# Patient Record
Sex: MALE | Race: WHITE | Employment: OTHER | URBAN - METROPOLITAN AREA
[De-identification: names, ages, dates, MRNs, and addresses within clinical notes are randomized per-mention and may not be internally consistent; named-entity substitution may affect disease eponyms.]

---

## 2014-03-05 LAB
CREAT SERPL-MCNC: 1 MG/DL (ref 0.6–1.3)
GFR SERPL CREATININE-BSD FRML MDRD: >60 ML/MIN/1.73M2 (ref 90–120)

## 2017-03-03 ENCOUNTER — TELEPHONE (OUTPATIENT)
Dept: PEDIATRICS | Facility: CLINIC | Age: 67
End: 2017-03-03

## 2017-03-03 NOTE — TELEPHONE ENCOUNTER
Panel Management Review      Patient has the following on his problem list: None      Composite cancer screening  Chart review shows that this patient is due/due soon for the following Colonoscopy and px and fasten labs  Summary:    Patient is due/failing the following:   COLONOSCOPY and PHYSICAL    Action needed:   Patient needs office visit for Px. Will discuss Colonoscopy at that time.    Type of outreach:    Sent [employer] message.    Questions for provider review:    None                                                                                                                                    Pati NAJERA, THU,AAMA       Chart routed to Care Team .

## 2017-04-03 DIAGNOSIS — I25.83 CORONARY ARTERY DISEASE DUE TO LIPID RICH PLAQUE: ICD-10-CM

## 2017-04-03 DIAGNOSIS — I25.10 CORONARY ARTERY DISEASE DUE TO LIPID RICH PLAQUE: ICD-10-CM

## 2017-04-03 RX ORDER — AMLODIPINE BESYLATE 5 MG/1
5 TABLET ORAL DAILY
Qty: 90 TABLET | Refills: 1 | Status: SHIPPED | OUTPATIENT
Start: 2017-04-03 | End: 2017-08-25

## 2017-04-24 ENCOUNTER — TRANSFERRED RECORDS (OUTPATIENT)
Dept: HEALTH INFORMATION MANAGEMENT | Facility: CLINIC | Age: 67
End: 2017-04-24

## 2017-04-29 DIAGNOSIS — E11.29 TYPE 2 DIABETES MELLITUS WITH OTHER DIABETIC KIDNEY COMPLICATION (H): ICD-10-CM

## 2017-05-01 NOTE — TELEPHONE ENCOUNTER
Prescription approved per [facility] Refill Protocol.  Upcoming physical appointment this week.    Alanna Isaac, RN  Triage Nurse

## 2017-05-01 NOTE — TELEPHONE ENCOUNTER
METFORMIN 1000MG         Last Written Prescription Date: 10/28/2016  Last Fill Quantity: 90, # refills: 1  Last Office Visit with [facility], [facility] or  Health prescribing provider:  10/28/2016   Next 5 appointments (look out 90 days)     May 05, 2017  2:20 PM CDT   PHYSICAL with Nick Morton MD   [facility] ([facility])    [address] [place] [address] [address]  [facility] [address] [phone]                   BP Readings from Last 3 Encounters:   10/28/16 108/68   10/18/16 130/78   04/11/16 124/70     Lab Results   Component Value Date    MICROL 7 10/28/2016     Lab Results   Component Value Date    UMALCR 5.75 10/28/2016     Creatinine   Date Value Ref Range Status   03/11/2016 0.78 0.66 - 1.25 mg/dL Final   ]  GFR Estimate   Date Value Ref Range Status   03/11/2016 >90  Non  GFR Calc   >60 mL/min/1.7m2 Final   02/17/2016 80 >60 mL/min/1.7m2 Final     Comment:     Non  GFR Calc   01/28/2016 83 >60 mL/min/1.7m2 Final     Comment:     Non  GFR Calc     GFR Estimate If Black   Date Value Ref Range Status   03/11/2016 >90   GFR Calc   >60 mL/min/1.7m2 Final   02/17/2016 >90   GFR Calc   >60 mL/min/1.7m2 Final   01/28/2016 >90   GFR Calc   >60 mL/min/1.7m2 Final     Lab Results   Component Value Date    CHOL  12/20/2016     Canceled, Test credited   Charge credited  CORRECTED ON 12/22 AT 0930: PREVIOUSLY REPORTED        Lab Results   Component Value Date    HDL  12/20/2016     Canceled, Test credited   Charge credited  CORRECTED ON 12/22 AT 0930: PREVIOUSLY REPORTED AS 38       Lab Results   Component Value Date    LDL 56 12/20/2016     Lab Results   Component Value Date    TRIG  12/20/2016     Canceled, Test credited   Charge credited   Fasting specimen  CORRECTED ON 12/22 AT 0930: PREVIOUSLY REPORTED  Borderline high:  150 199   mg/dl High:             200 499 mg/dl Very high:        >499 mg/dl Fasting   specimen       Lab Results   Component Value Date    CHOLHDLRATIO 4.4 09/25/2015     Lab Results   Component Value Date    AST 16 09/25/2015     Lab Results   Component Value Date    ALT  12/20/2016     Canceled, Test credited   Charge credited  CORRECTED ON 12/22 AT 0930: PREVIOUSLY REPORTED AS 23       Lab Results   Component Value Date    A1C 8.7 12/20/2016    A1C 8.0 10/28/2016    A1C 7.9 01/27/2016    A1C 7.3 09/25/2015    A1C 7.6 03/06/2015     Potassium   Date Value Ref Range Status   03/11/2016 4.5 3.4 - 5.3 mmol/L Final

## 2017-05-05 ENCOUNTER — OFFICE VISIT (OUTPATIENT)
Dept: PEDIATRICS | Facility: CLINIC | Age: 67
End: 2017-05-05
Payer: COMMERCIAL

## 2017-05-05 VITALS
HEART RATE: 88 BPM | WEIGHT: 225.3 LBS | RESPIRATION RATE: 14 BRPM | TEMPERATURE: 98.1 F | HEIGHT: 70 IN | DIASTOLIC BLOOD PRESSURE: 70 MMHG | SYSTOLIC BLOOD PRESSURE: 118 MMHG | BODY MASS INDEX: 32.25 KG/M2

## 2017-05-05 DIAGNOSIS — Z71.89 GOALS OF CARE, COUNSELING/DISCUSSION: ICD-10-CM

## 2017-05-05 DIAGNOSIS — Z00.00 ENCOUNTER FOR ROUTINE ADULT HEALTH EXAMINATION WITHOUT ABNORMAL FINDINGS: Primary | ICD-10-CM

## 2017-05-05 DIAGNOSIS — I25.83 CORONARY ARTERY DISEASE DUE TO LIPID RICH PLAQUE: ICD-10-CM

## 2017-05-05 DIAGNOSIS — E78.5 HYPERLIPIDEMIA LDL GOAL <100: ICD-10-CM

## 2017-05-05 DIAGNOSIS — I25.10 CORONARY ARTERY DISEASE DUE TO LIPID RICH PLAQUE: ICD-10-CM

## 2017-05-05 DIAGNOSIS — E11.29 TYPE 2 DIABETES MELLITUS WITH OTHER DIABETIC KIDNEY COMPLICATION (H): ICD-10-CM

## 2017-05-05 DIAGNOSIS — Z12.11 SPECIAL SCREENING FOR MALIGNANT NEOPLASMS, COLON: ICD-10-CM

## 2017-05-05 LAB — HBA1C MFR BLD: 11.3 % (ref 4.3–6)

## 2017-05-05 PROCEDURE — 83036 HEMOGLOBIN GLYCOSYLATED A1C: CPT | Performed by: INTERNAL MEDICINE

## 2017-05-05 PROCEDURE — 36415 COLL VENOUS BLD VENIPUNCTURE: CPT | Performed by: INTERNAL MEDICINE

## 2017-05-05 PROCEDURE — 99397 PER PM REEVAL EST PAT 65+ YR: CPT | Performed by: INTERNAL MEDICINE

## 2017-05-05 PROCEDURE — 80053 COMPREHEN METABOLIC PANEL: CPT | Performed by: INTERNAL MEDICINE

## 2017-05-05 RX ORDER — ROSUVASTATIN CALCIUM 40 MG/1
40 TABLET, COATED ORAL DAILY
Qty: 90 TABLET | Refills: 3 | Status: SHIPPED | OUTPATIENT
Start: 2017-05-05 | End: 2017-06-27

## 2017-05-05 NOTE — NURSING NOTE
"Chief Complaint   Patient presents with     Physical       Initial /70  Pulse 88  Temp 98.1  F (36.7  C) (Oral)  Resp 14  Ht 5' 10\" (1.778 m)  Wt 225 lb 4.8 oz (102.2 kg)  BMI 32.33 kg/m2 Estimated body mass index is 32.33 kg/(m^2) as calculated from the following:    Height as of this encounter: 5' 10\" (1.778 m).    Weight as of this encounter: 225 lb 4.8 oz (102.2 kg).  Medication Reconciliation: complete   Pati J, CMA,AAMA      "

## 2017-05-05 NOTE — PATIENT INSTRUCTIONS
Preventive Health Recommendations:       Male Ages 65 and over    Yearly exam:             See your health care provider every year in order to  o   Review health changes.   o   Discuss preventive care.    o   Review your medicines if your doctor has prescribed any.    Talk with your health care provider about whether you should have a test to screen for prostate cancer (PSA).    Every 3 years, have a diabetes test (fasting glucose). If you are at risk for diabetes, you should have this test more often.    Every 5 years, have a cholesterol test. Have this test more often if you are at risk for high cholesterol or heart disease.     Every 10 years, have a colonoscopy. Or, have a yearly FIT test (stool test). These exams will check for colon cancer.    Talk to with your health care provider about screening for Abdominal Aortic Aneurysm if you have a family history of AAA or have a history of smoking.  Shots:     Get a flu shot each year.     Get a tetanus shot every 10 years.     Talk to your doctor about your pneumonia vaccines. There are now two you should receive - Pneumovax (PPSV 23) and Prevnar (PCV 13).    Talk to your doctor about a shingles vaccine.     Talk to your doctor about the hepatitis B vaccine.  Nutrition:     Eat at least 5 servings of fruits and vegetables each day.     Eat whole-grain bread, whole-wheat pasta and brown rice instead of white grains and rice.     Talk to your doctor about Calcium and Vitamin D.   Lifestyle    Exercise for at least 150 minutes a week (30 minutes a day, 5 days a week). This will help you control your weight and prevent disease.     Limit alcohol to one drink per day.     No smoking.     Wear sunscreen to prevent skin cancer.     See your dentist every six months for an exam and cleaning.     See your eye doctor every 1 to 2 years to screen for conditions such as glaucoma, macular degeneration and cataracts.    Lab Results   Component Value Date    A1C 11.3 05/05/2017     A1C 8.7 12/20/2016    A1C 8.0 10/28/2016    A1C 7.9 01/27/2016    A1C 7.3 09/25/2015

## 2017-05-05 NOTE — MR AVS SNAPSHOT
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   o   Discuss preventive care.    o   Review your medicines if your doctor has prescribed any.    Talk with your health care provider about whether you should have a test to screen for prostate cancer (PSA).    Every 3 years, have a diabetes test (fasting glucose). If you are at risk for diabetes, you should have this test more often.    Every 5 years, have a cholesterol test. Have this test more often if you are at risk for high cholesterol or heart disease.     Every 10 years, have a colonoscopy. Or, have a yearly FIT test (stool test). These exams will check for colon cancer.    Talk to with your health care provider about screening for Abdominal Aortic Aneurysm if you have a family history of AAA or have a history of smoking.  Shots:     Get a flu shot each year.     Get a tetanus shot every 10 years.     Talk to your doctor about your pneumonia vaccines. There are now two you should receive - Pneumovax (PPSV 23) and Prevnar (PCV 13).    Talk to your doctor about a shingles vaccine.     Talk to your doctor about the hepatitis B vaccine.  Nutrition:     Eat at least 5 servings of  fruits and vegetables each day.     Eat whole-grain bread, whole-wheat pasta and brown rice instead of white grains and rice.     Talk to your doctor about Calcium and Vitamin D.   Lifestyle    Exercise for at least 150 minutes a week (30 minutes a day, 5 days a week). This will help you control your weight and prevent disease.     Limit alcohol to one drink per day.     No smoking.     Wear sunscreen to prevent skin cancer.     See your dentist every six months for an exam and cleaning.     See your eye doctor every 1 to 2 years to screen for conditions such as glaucoma, macular degeneration and cataracts.    Lab Results   Component Value Date    A1C 11.3 05/05/2017    A1C 8.7 12/20/2016    A1C 8.0 10/28/2016    A1C 7.9 01/27/2016    A1C 7.3 09/25/2015               Follow-ups after your visit        Additional Services     GASTROENTEROLOGY ADULT REF PROCEDURE ONLY       Last Lab Result: Creatinine (mg/dL)       Date                     Value                 03/11/2016               0.78             ----------  Body mass index is 32.33 kg/(m^2).     Needed:  No  Language:  English    Patient will be contacted to schedule procedure.     Please be aware that coverage of these services is subject to the terms and limitations of your health insurance plan.  Call member services at your health plan with any benefit or coverage questions.  Any procedures must be performed at a [facility] facility OR coordinated by your clinic's referral office.    Please bring the following with you to your appointment:    (1) Any X-Rays, CTs or MRIs which have been performed.  Contact the facility where they were done to arrange for  prior to your scheduled appointment.    (2) List of current medications   (3) This referral request   (4) Any documents/labs given to you for this referral                  Future tests that were ordered for you today     Open Future Orders        Priority Expected Expires Ordered    **A1C  "FUTURE 6mo Routine 11/1/2017 12/1/2017 5/5/2017            Who to contact     If you have questions or need follow up information about today's clinic visit or your schedule please contact [facility] [place] directly at [phone].  Normal or non-critical lab and imaging results will be communicated to you by MyChart, letter or phone within 4 business days after the clinic has received the results. If you do not hear from us within 7 days, please contact the clinic through [facility] [employer] or phone. If you have a critical or abnormal lab result, we will notify you by phone as soon as possible.  Submit refill requests through [email or web address] or call your pharmacy and they will forward the refill request to us. Please allow 3 business days for your refill to be completed.          Additional Information About Your Visit        [facility] [employer] Information     [email or web address] gives you secure access to your electronic health record. If you see a primary care provider, you can also send messages to your care team and make appointments. If you have questions, please call your primary care clinic.  If you do not have a primary care provider, please call [phone] and they will assist you.        Care EveryWhere ID     This is your Care EveryWhere ID. This could be used by other organizations to access your [facility] medical records  [id number]        Your Vitals Were     Pulse Temperature Respirations Height BMI (Body Mass Index)       88 98.1  F (36.7  C) (Oral) 14 5' 10\" (1.778 m) 32.33 kg/m2        Blood Pressure from Last 3 Encounters:   05/05/17 118/70   10/28/16 108/68   10/18/16 130/78    Weight from Last 3 Encounters:   05/05/17 225 lb 4.8 oz (102.2 kg)   10/28/16 229 lb 3.2 oz (104 kg)   10/18/16 227 lb 11.2 oz (103.3 kg)              We Performed the Following     Comprehensive metabolic panel (BMP + Alb, Alk Phos, ALT, AST, Total. Bili, TP)     GASTROENTEROLOGY ADULT REF PROCEDURE ONLY     Hemoglobin A1c          Where to get your " medicines      These medications were sent to [id number] [place] - [address] [facility] [address]  [address], [place] [address]     Phone:  [phone]     rosuvastatin 40 MG tablet          Primary Care Provider Office Phone # Fax #    Nick Morton [phone]       [facility] [address] [place] [address]        Thank you!     Thank you for choosing [facility]  for your care. Our goal is always to provide you with excellent care. Hearing back from our patients is one way we can continue to improve our services. Please take a few minutes to complete the written survey that you may receive in the mail after your visit with us. Thank you!             Your Updated Medication List - Protect others around you: Learn how to safely use, store and throw away your medicines at www.disposemymeds.org.          This list is accurate as of: 5/5/17  3:50 PM.  Always use your most recent med list.                   Brand Name Dispense Instructions for use    amLODIPine 5 MG tablet    NORVASC    90 tablet    Take 1 tablet (5 mg) by mouth daily       aspirin 81 MG tablet      Take 81 mg by mouth daily       blood glucose monitoring lancets     3 Box    change every 3 pokes.       blood glucose monitoring test strip    FREESTYLE TEST STRIPS    100 strip    use as directed       carvedilol 6.25 MG tablet    COREG    180 tablet    Take 1 tablet (6.25 mg) by mouth 2 times daily (with meals)       clopidogrel 75 MG tablet    PLAVIX    90 tablet    Take 1 tablet (75 mg) by mouth daily       glucosamine-chondroitin 500-400 MG Caps per capsule      Take 1 capsule by mouth daily       lisinopril 5 MG tablet    PRINIVIL/ZESTRIL    90 tablet    Take 1 tablet (5 mg) by mouth daily       metFORMIN 1000 MG tablet    GLUCOPHAGE    180 tablet    TAKE 1 TABLET(1000 MG) BY MOUTH TWICE DAILY WITH MEALS       Multi-vitamin Tabs tablet      Take 1  tablet by mouth daily       nitroglycerin 0.4 MG sublingual tablet    NITROSTAT    25 tablet    Place 1 tablet (0.4 mg) under the tongue every 5 minutes as needed for chest pain       rosuvastatin 40 MG tablet    CRESTOR    90 tablet    Take 1 tablet (40 mg) by mouth daily       tadalafil 10 MG tablet    CIALIS    12 tablet    Take 1 tablet (10 mg) by mouth daily as needed for erectile dysfunction TAKEN AT LEAST 30 MINUTES BEFORE SEXUAL ACTIVITY       vitamin B complex with vitamin C Tabs tablet      Take 1 tablet by mouth daily       VITAMIN D3 PO      Take 2,000 Units by mouth daily

## 2017-05-05 NOTE — PROGRESS NOTES
SUBJECTIVE:                                                            Karsten Christy is a 66 year old male who presents for Preventive Visit.  Are you in the first 12 months of your Medicare coverage?  Within one year      Physical   Annual:     Getting at least 3 servings of Calcium per day::  Yes    Bi-annual eye exam::  Yes    Dental care twice a year::  Yes    Sleep apnea or symptoms of sleep apnea::  Sleep apnea    Diet::  Low salt, Low fat/cholesterol, Diabetic and Carbohydrate counting    Frequency of exercise::  1 day/week    Duration of exercise::  15-30 minutes    Taking medications regularly::  Yes    Medication side effects::  Not applicable    Additional concerns today::  YES      COGNITIVE SCREEN  1) Repeat 3 items (Banana, Sunrise, Chair)    2) Clock draw: NORMAL  3) 3 item recall: Recalls 3 objects  Results: 3 items recalled: COGNITIVE IMPAIRMENT LESS LIKELY    Mini-CogTM Copyright S Diana. Licensed by the author for use in [facility] [employer]; reprinted with permission ([email or web address]). All rights reserved.        Reviewed and updated as needed this visit by clinical staff  Tobacco  Allergies  Med Hx  Surg Hx  Fam Hx  Soc Hx        Reviewed and updated as needed this visit by Provider        Social History   Substance Use Topics     Smoking status: Former Smoker     Types: Cigars     Smokeless tobacco: Never Used      Comment: cigar once per month- none lately     Alcohol use 0.0 oz/week     0 Standard drinks or equivalent per week      Comment: 3 drinks per week       The patient does not drink >3 drinks per day nor >7 drinks per week.      Karsten presents today for follow up of DIABETES MELLITUS.  Patient is being treated with oral agents and diet.  Patient glucose self monitoring as follows: rarely.     Patient concerns: is concerned about drinkign lots more lemonaide, thinks a1c will be worse. feels well.        Today's PHQ-2 Score: 0  PHQ-2 ( 1999 [employer]) 5/5/2017   Q1: Little interest or  pleasure in doing things -   Q2: Feeling down, depressed or hopeless -   PHQ-2 Score -   Little interest or pleasure in doing things Not at all   Feeling down, depressed or hopeless Not at all   PHQ-2 Score 0       Do you feel safe in your environment - Yes    Do you have a Health Care Directive?: Yes: Advance Directive has been received and scanned.    Current providers sharing in care for this patient include:   Patient Care Team:  Nick Morton MD as PCP - General (Internal Medicine)      Hearing impairment: Yes, hearing device    Ability to successfully perform activities of daily living: Yes, no assistance needed     Fall risk:  Fallen 2 or more times in the past year?: No  Any fall with injury in the past year?: No    Home safety:  none identified      The following health maintenance items are reviewed in Epic and correct as of today:  Health Maintenance   Topic Date Due     AORTIC ANEURYSM SCREENING (SYSTEM ASSIGNED)  08/15/2014     FALL RISK ASSESSMENT  03/06/2016     ADVANCE DIRECTIVE PLANNING Q5 YRS (NO INBASKET)  07/15/2016     COLONOSCOPY Q10 YEARS NO INBASKET MESSAGE  07/20/2016     EYE EXAM Q1 YEAR( NO INBASKET)  01/21/2017     CREATININE Q1 YEAR (NO INBASKET)  03/11/2017     A1C Q6 MO( NO INBASKET)  06/20/2017     INFLUENZA VACCINE (SYSTEM ASSIGNED)  09/01/2017     FOOT EXAM Q1 YEAR( NO INBASKET)  10/28/2017     MICROALBUMIN Q1 YEAR( NO INBASKET)  10/28/2017     LIPID MONITORING Q1 YEAR( NO INBASKET)  12/20/2017     PSA Q2 YR  03/18/2018     TSH W/ FREE T4 REFLEX Q2 YEAR (NO INBASKET)  10/28/2018     TETANUS Q10 YR  03/06/2025     PNEUMOCOCCAL  Completed     HEPATITIS C SCREENING  Completed           ROS:  Constitutional, HEENT, cardiovascular, pulmonary, GI, , musculoskeletal, neuro, skin, endocrine and psych systems are negative, except as otherwise noted.    Problem list, Medication list, Allergies, and Medical/Social/Surgical histories reviewed in EPIC and updated as  "appropriate.  OBJECTIVE:                                                            /70  Pulse 88  Temp 98.1  F (36.7  C) (Oral)  Resp 14  Ht 5' 10\" (1.778 m)  Wt 225 lb 4.8 oz (102.2 kg)  BMI 32.33 kg/m2 Estimated body mass index is 32.33 kg/(m^2) as calculated from the following:    Height as of this encounter: 5' 10\" (1.778 m).    Weight as of this encounter: 225 lb 4.8 oz (102.2 kg).  EXAM:   GENERAL: healthy, alert and no distress  EYES: Eyes grossly normal to inspection, PERRL and conjunctivae and sclerae normal  HENT: ear canals and TM's normal, nose and mouth without ulcers or lesions  NECK: no adenopathy, no asymmetry, masses, or scars and thyroid normal to palpation  RESP: lungs clear to auscultation - no rales, rhonchi or wheezes  CV: regular rate and rhythm, normal S1 S2, no S3 or S4, no murmur, click or rub, no peripheral edema and peripheral pulses strong  ABDOMEN: soft, nontender, no hepatosplenomegaly, no masses and bowel sounds normal  MS: no gross musculoskeletal defects noted, no edema  SKIN: no suspicious lesions or rashes  NEURO: Normal strength and tone, mentation intact and speech normal  PSYCH: mentation appears normal, affect normal/bright    Lab Results   Component Value Date    A1C 11.3 05/05/2017    A1C 8.7 12/20/2016    A1C 8.0 10/28/2016    A1C 7.9 01/27/2016    A1C 7.3 09/25/2015       ASSESSMENT / PLAN:                                                            1. Encounter for routine adult health examination without abnormal findings  d/w pt preventative care measures including seat belt use, bike helmet, moderation of EtOH, avoiding tobacco, avoiding excessive sun exposure/sunscreen, wt management or wt loss if BMI > 30, need to screen for lipid disorders, mood disorders, CAD risk factors, etc. Also discussed accident prevention and future RHM schedule.     2. Type 2 diabetes mellitus with other diabetic kidney complication; goal A1c < 7%  discussed with patient (or " "patient's parents/caregiver) pathophysiology of condition and treatment options.  a1c much worse, history suggestive of diet as the cause. reviwed with patient this is a very major decline in contorl, patient aware and will work on diet. recheck a1c in 3 months and if not sig improvement would do MTM referral and consider further modalities. patient agrees and will work hard on diet.   - Hemoglobin A1c  - Comprehensive metabolic panel (BMP + Alb, Alk Phos, ALT, AST, Total. Bili, TP)  - **A1C FUTURE 6mo; Future    3. Special screening for malignant neoplasms, colon  - GASTROENTEROLOGY ADULT REF PROCEDURE ONLY    4. Blood pressure goal < 140/90  discussed with patient (or patient's parents/caregiver) pathophysiology of condition and treatment options.  reviewed labs, medications, diet ,etc.      5. Hyperlipidemia LDL goal <100  discussed with patient (or patient's parents/caregiver) pathophysiology of condition and treatment options.  reviewed labs, medications, diet ,etc.      6. Coronary artery disease due to lipid rich plaque  Well controlled on current medication(s). discussed with patient (or patient's parents/caregiver) pathophysiology of condition and treatment options.  reviewed labs, medications, diet ,etc.    - rosuvastatin (CRESTOR) 40 MG tablet; Take 1 tablet (40 mg) by mouth daily  Dispense: 90 tablet; Refill: 3    End of Life Planning:  Patient currently has an advanced directive: Yes.  Practitioner is supportive of decision.    COUNSELING:  Reviewed preventive health counseling, as reflected in patient instructions        Estimated body mass index is 32.33 kg/(m^2) as calculated from the following:    Height as of this encounter: 5' 10\" (1.778 m).    Weight as of this encounter: 225 lb 4.8 oz (102.2 kg).     reports that he has quit smoking. His smoking use included Cigars. He has never used smokeless tobacco.      Appropriate preventive services were discussed with this patient, including applicable " screening as appropriate for cardiovascular disease, diabetes, osteopenia/osteoporosis, and glaucoma.  As appropriate for age/gender, discussed screening for colorectal cancer, prostate cancer, breast cancer, and cervical cancer. Checklist reviewing preventive services available has been given to the patient.    Reviewed patients plan of care and provided an AVS. The Basic Care Plan (routine screening as documented in Health Maintenance) for Karsten meets the Care Plan requirement. This Care Plan has been established and reviewed with the Patient.    Counseling Resources:  ATP IV Guidelines  Pooled Cohorts Equation Calculator  Breast Cancer Risk Calculator  FRAX Risk Assessment  ICSI Preventive Guidelines  Dietary Guidelines for Americans, 2010  USDA's MyPlate  ASA Prophylaxis  Lung CA Screening    I have discussed with patient the risks, benefits, medications, treatment options and modalities.   I have instructed the patient to call or schedule a follow-up appointment if any problems or failure to improve.       Nick Morton MD  [facility] [place]      Answers for HPI/ROS submitted by the patient on 5/5/2017   Q1: Little interest or pleasure in doing things: 0=Not at all  Q2: Feeling down, depressed or hopeless: 0=Not at all  PHQ-2 Score: 0

## 2017-05-06 LAB
ALBUMIN SERPL-MCNC: 3.5 G/DL (ref 3.4–5)
ALP SERPL-CCNC: 132 U/L (ref 40–150)
ALT SERPL W P-5'-P-CCNC: 23 U/L (ref 0–70)
ANION GAP SERPL CALCULATED.3IONS-SCNC: 10 MMOL/L (ref 3–14)
AST SERPL W P-5'-P-CCNC: 9 U/L (ref 0–45)
BILIRUB SERPL-MCNC: 1 MG/DL (ref 0.2–1.3)
BUN SERPL-MCNC: 15 MG/DL (ref 7–30)
CALCIUM SERPL-MCNC: 9.2 MG/DL (ref 8.5–10.1)
CHLORIDE SERPL-SCNC: 99 MMOL/L (ref 94–109)
CO2 SERPL-SCNC: 25 MMOL/L (ref 20–32)
CREAT SERPL-MCNC: 0.84 MG/DL (ref 0.66–1.25)
GFR SERPL CREATININE-BSD FRML MDRD: ABNORMAL ML/MIN/1.7M2
GLUCOSE SERPL-MCNC: 427 MG/DL (ref 70–99)
POTASSIUM SERPL-SCNC: 4.4 MMOL/L (ref 3.4–5.3)
PROT SERPL-MCNC: 6.6 G/DL (ref 6.8–8.8)
SODIUM SERPL-SCNC: 134 MMOL/L (ref 133–144)

## 2017-06-27 DIAGNOSIS — I25.10 CORONARY ARTERY DISEASE DUE TO LIPID RICH PLAQUE: ICD-10-CM

## 2017-06-27 DIAGNOSIS — I25.83 CORONARY ARTERY DISEASE DUE TO LIPID RICH PLAQUE: ICD-10-CM

## 2017-06-27 RX ORDER — ROSUVASTATIN CALCIUM 40 MG/1
40 TABLET, COATED ORAL DAILY
Qty: 90 TABLET | Refills: 1 | Status: SHIPPED | OUTPATIENT
Start: 2017-06-27

## 2017-07-06 ENCOUNTER — TELEPHONE (OUTPATIENT)
Dept: PHARMACY | Facility: CLINIC | Age: 67
End: 2017-07-06

## 2017-07-06 NOTE — TELEPHONE ENCOUNTER
Please call patient to see Dr. Morton for diabetes check in August.    Ely Limon, PharmD [facility]  Medication Therapy Management Practitioner   #[phone]

## 2017-07-07 NOTE — TELEPHONE ENCOUNTER
Appt scheduled for 08/25/2017. Pt sent a reminder via my chart as requested.  Pati NAJERA, THU,MARY

## 2017-07-14 ENCOUNTER — HOSPITAL ENCOUNTER (OUTPATIENT)
Facility: CLINIC | Age: 67
Discharge: HOME | End: 2017-07-14
Attending: INTERNAL MEDICINE | Admitting: INTERNAL MEDICINE
Payer: COMMERCIAL

## 2017-07-14 VITALS
BODY MASS INDEX: 30.8 KG/M2 | OXYGEN SATURATION: 95 % | RESPIRATION RATE: 16 BRPM | SYSTOLIC BLOOD PRESSURE: 118 MMHG | DIASTOLIC BLOOD PRESSURE: 70 MMHG | HEIGHT: 71 IN | WEIGHT: 220 LBS

## 2017-07-14 LAB
COLONOSCOPY: NORMAL
GLUCOSE BLDC GLUCOMTR-MCNC: 174 MG/DL (ref 70–99)

## 2017-07-14 PROCEDURE — 25000128 H RX IP 250 OP 636: Performed by: INTERNAL MEDICINE

## 2017-07-14 PROCEDURE — G0500 MOD SEDAT ENDO SERVICE >5YRS: HCPCS | Performed by: INTERNAL MEDICINE

## 2017-07-14 PROCEDURE — 88305 TISSUE EXAM BY PATHOLOGIST: CPT | Performed by: INTERNAL MEDICINE

## 2017-07-14 PROCEDURE — 45380 COLONOSCOPY AND BIOPSY: CPT | Performed by: INTERNAL MEDICINE

## 2017-07-14 PROCEDURE — 88305 TISSUE EXAM BY PATHOLOGIST: CPT | Mod: 26 | Performed by: INTERNAL MEDICINE

## 2017-07-14 PROCEDURE — 82962 GLUCOSE BLOOD TEST: CPT

## 2017-07-14 PROCEDURE — 45385 COLONOSCOPY W/LESION REMOVAL: CPT | Performed by: INTERNAL MEDICINE

## 2017-07-14 RX ORDER — FENTANYL CITRATE 50 UG/ML
INJECTION, SOLUTION INTRAMUSCULAR; INTRAVENOUS PRN
Status: DISCONTINUED | OUTPATIENT
Start: 2017-07-14 | End: 2017-07-14 | Stop reason: HOSPADM

## 2017-07-14 RX ORDER — ONDANSETRON 2 MG/ML
4 INJECTION INTRAMUSCULAR; INTRAVENOUS EVERY 6 HOURS PRN
Status: DISCONTINUED | OUTPATIENT
Start: 2017-07-14 | End: 2017-07-14 | Stop reason: HOSPADM

## 2017-07-14 RX ORDER — NALOXONE HYDROCHLORIDE 0.4 MG/ML
.1-.4 INJECTION, SOLUTION INTRAMUSCULAR; INTRAVENOUS; SUBCUTANEOUS
Status: DISCONTINUED | OUTPATIENT
Start: 2017-07-14 | End: 2017-07-14 | Stop reason: HOSPADM

## 2017-07-14 RX ORDER — ONDANSETRON 4 MG/1
4 TABLET, ORALLY DISINTEGRATING ORAL EVERY 6 HOURS PRN
Status: DISCONTINUED | OUTPATIENT
Start: 2017-07-14 | End: 2017-07-14 | Stop reason: HOSPADM

## 2017-07-14 RX ORDER — LIDOCAINE 40 MG/G
CREAM TOPICAL
Status: DISCONTINUED | OUTPATIENT
Start: 2017-07-14 | End: 2017-07-14 | Stop reason: HOSPADM

## 2017-07-14 RX ORDER — FLUMAZENIL 0.1 MG/ML
0.2 INJECTION, SOLUTION INTRAVENOUS
Status: DISCONTINUED | OUTPATIENT
Start: 2017-07-14 | End: 2017-07-14 | Stop reason: HOSPADM

## 2017-07-14 RX ORDER — ONDANSETRON 2 MG/ML
4 INJECTION INTRAMUSCULAR; INTRAVENOUS
Status: DISCONTINUED | OUTPATIENT
Start: 2017-07-14 | End: 2017-07-14 | Stop reason: HOSPADM

## 2017-07-14 NOTE — IP AVS SNAPSHOT
MRN:[id number]                      After Visit Summary   7/14/2017    Karsten Christy    MRN: [id number]           Thank you!     Thank you for choosing [facility] for your care. Our goal is always to provide you with excellent care. Hearing back from our patients is one way we can continue to improve our services. Please take a few minutes to complete the written survey that you may receive in the mail after you visit. If you would like to speak to someone directly about your visit please contact Patient Relations at [phone]. Thank you!          Patient Information     Date Of Birth          1950        About your hospital stay     You were admitted on:  July 14, 2017 You last received care in the:  [facility]    You were discharged on:  July 14, 2017       Who to Call     For medical emergencies, please call 911.  For non-urgent questions about your medical care, please call your primary care provider or clinic, [phone]  For questions related to your surgery, please call your surgery clinic        Attending Provider     Provider Specialty    Gabriella England MD Gastroenterology       Primary Care Provider Office Phone # Fax #    Nick Morton [phone]      Your next 10 appointments already scheduled     Aug 25, 2017 10:00 AM CDT   Office Visit with Nick Morton MD   [facility] ([facility]    [address] [place] [address]  [address]  [facility] [address] [phone]           Bring a current list of meds and any records pertaining to this visit.  For Physicals, please bring immunization records and any forms needing to be filled out.  Please arrive 10 minutes early to complete paperwork.              Further instructions from your care team         Understanding Colon and Rectal Polyps     The colon has a smooth lining composed of millions of cells.     The colon (also called the large  intestine) is a muscular tube that forms the last part of the digestive tract. It absorbs water and stores food waste. The colon is about 4 to 6 feet long. The rectum is the last 6 inches of the colon. The colon and rectum have a smooth lining composed of millions of cells. Changes in these cells can lead to growths in the colon that can become cancerous and should be removed.     When the Colon Lining Changes  Changes that occur in the cells that line the colon or rectum can lead to growths called polyps. Over a period of years, polyps can turn cancerous. Removing polyps early may prevent cancer from ever forming.      Polyps  Polyps are fleshy clumps of tissue that form on the lining of the colon or rectum. Small polyps are usually benign (not cancerous). However, over time, cells in a polyp can change and become cancerous. The larger a polyp grows, the more likely this is to happen. Also, certain types of polyps known as adenomatous polyps are considered premalignant. This means that they will almost always become cancerous if they re not removed.          Cancer  Almost all colorectal cancers start when polyp cells begin growing abnormally. As a cancerous tumor grows, it may involve more and more of the colon or rectum. In time, cancer can also grow beyond the colon or rectum and spread to nearby organs or to glands called lymph nodes. The cells can also travel to other parts of the body. This is known as metastasis. The earlier a cancerous tumor is removed, the better the chance of preventing its spread.        [id number] [facility], [address] [place] [address]. All rights reserved. This information is not intended as a substitute for professional medical care. Always follow your healthcare professional's instructions.    Pending Results     No orders found from 7/12/2017 to 7/15/2017.            Admission Information     Date & Time Provider Department Dept. Phone    7/14/2017 Gabriella England  "MD [facility] End[facility] [phone]      Your Vitals Were     Blood Pressure Respirations Height Weight Pulse Oximetry BMI (Body Mass Index)    120/78 15 1.791 m (5' 10.5\") 99.8 kg (220 lb) 95% 31.12 kg/m2      MyChart Information     [employer] gives you secure access to your electronic health record. If you see a primary care provider, you can also send messages to your care team and make appointments. If you have questions, please call your primary care clinic.  If you do not have a primary care provider, please call [phone] and they will assist you.        Care EveryWhere ID     This is your Care EveryWhere ID. This could be used by other organizations to access your [facility] medical records  [id number]        Equal Access to Services     KIRT HE : Lauren Ambrose, misty castillo, lukas turner, julia martínez. So [facility] [phone].    ATENCIÓN: Si habla español, tiene a barnes disposición servicios gratuitos de asistencia lingüística. Llame al [phone].    We comply with applicable federal civil rights laws and [place] laws. We do not discriminate on the basis of race, color, national origin, age, disability sex, sexual orientation or gender identity.               Review of your medicines      CONTINUE these medicines which have NOT CHANGED        Dose / Directions    amLODIPine 5 MG tablet   Commonly known as:  NORVASC   Used for:  Coronary artery disease due to lipid rich plaque        Dose:  5 mg   Take 1 tablet (5 mg) by mouth daily   Quantity:  90 tablet   Refills:  1       aspirin 81 MG tablet        Dose:  81 mg   Take 81 mg by mouth daily   Refills:  0       blood glucose monitoring lancets   Used for:  Mixed hyperlipidemia        change every 3 pokes.   Quantity:  3 Box   Refills:  3       blood glucose monitoring test strip   Commonly known as:  FREESTYLE TEST STRIPS   Used for:  Mixed hyperlipidemia        use as directed "   Quantity:  100 strip   Refills:  11       carvedilol 6.25 MG tablet   Commonly known as:  COREG   Used for:  Coronary artery disease due to lipid rich plaque        Dose:  6.25 mg   Take 1 tablet (6.25 mg) by mouth 2 times daily (with meals)   Quantity:  180 tablet   Refills:  3       clopidogrel 75 MG tablet   Commonly known as:  PLAVIX   Used for:  Coronary artery disease due to lipid rich plaque        Dose:  75 mg   Take 1 tablet (75 mg) by mouth daily   Quantity:  90 tablet   Refills:  3       glucosamine-chondroitin 500-400 MG Caps per capsule        Dose:  1 capsule   Take 1 capsule by mouth daily   Refills:  0       lisinopril 5 MG tablet   Commonly known as:  PRINIVIL/ZESTRIL   Used for:  Coronary artery disease due to lipid rich plaque        Dose:  5 mg   Take 1 tablet (5 mg) by mouth daily   Quantity:  90 tablet   Refills:  3       metFORMIN 1000 MG tablet   Commonly known as:  GLUCOPHAGE   Used for:  Type 2 diabetes mellitus with other diabetic kidney complication (H)        TAKE 1 TABLET(1000 MG) BY MOUTH TWICE DAILY WITH MEALS   Quantity:  180 tablet   Refills:  1       Multi-vitamin Tabs tablet        Dose:  1 tablet   Take 1 tablet by mouth daily   Refills:  0       nitroGLYcerin 0.4 MG sublingual tablet   Commonly known as:  NITROSTAT   Used for:  Unstable angina (H)        Dose:  0.4 mg   Place 1 tablet (0.4 mg) under the tongue every 5 minutes as needed for chest pain   Quantity:  25 tablet   Refills:  2       rosuvastatin 40 MG tablet   Commonly known as:  CRESTOR   Used for:  Coronary artery disease due to lipid rich plaque        Dose:  40 mg   Take 1 tablet (40 mg) by mouth daily   Quantity:  90 tablet   Refills:  1       tadalafil 10 MG tablet   Commonly known as:  CIALIS   Used for:  Impotence of organic origin        Dose:  10 mg   Take 1 tablet (10 mg) by mouth daily as needed for erectile dysfunction TAKEN AT LEAST 30 MINUTES BEFORE SEXUAL ACTIVITY   Quantity:  12 tablet   Refills:  5        vitamin B complex with vitamin C Tabs tablet        Dose:  1 tablet   Take 1 tablet by mouth daily   Refills:  0       VITAMIN D3 PO        Dose:  2000 Units   Take 2,000 Units by mouth daily   Refills:  0                Protect others around you: Learn how to safely use, store and throw away your medicines at www.disposemymeds.org.             Medication List: This is a list of all your medications and when to take them. Check marks below indicate your daily home schedule. Keep this list as a reference.      Medications           Morning Afternoon Evening Bedtime As Needed    amLODIPine 5 MG tablet   Commonly known as:  NORVASC   Take 1 tablet (5 mg) by mouth daily                                aspirin 81 MG tablet   Take 81 mg by mouth daily                                blood glucose monitoring lancets   change every 3 pokes.                                blood glucose monitoring test strip   Commonly known as:  FREESTYLE TEST STRIPS   use as directed                                carvedilol 6.25 MG tablet   Commonly known as:  COREG   Take 1 tablet (6.25 mg) by mouth 2 times daily (with meals)                                clopidogrel 75 MG tablet   Commonly known as:  PLAVIX   Take 1 tablet (75 mg) by mouth daily                                glucosamine-chondroitin 500-400 MG Caps per capsule   Take 1 capsule by mouth daily                                lisinopril 5 MG tablet   Commonly known as:  PRINIVIL/ZESTRIL   Take 1 tablet (5 mg) by mouth daily                                metFORMIN 1000 MG tablet   Commonly known as:  GLUCOPHAGE   TAKE 1 TABLET(1000 MG) BY MOUTH TWICE DAILY WITH MEALS                                Multi-vitamin Tabs tablet   Take 1 tablet by mouth daily                                nitroGLYcerin 0.4 MG sublingual tablet   Commonly known as:  NITROSTAT   Place 1 tablet (0.4 mg) under the tongue every 5 minutes as needed for chest pain                                 rosuvastatin 40 MG tablet   Commonly known as:  CRESTOR   Take 1 tablet (40 mg) by mouth daily                                tadalafil 10 MG tablet   Commonly known as:  CIALIS   Take 1 tablet (10 mg) by mouth daily as needed for erectile dysfunction TAKEN AT LEAST 30 MINUTES BEFORE SEXUAL ACTIVITY                                vitamin B complex with vitamin C Tabs tablet   Take 1 tablet by mouth daily                                VITAMIN D3 PO   Take 2,000 Units by mouth daily

## 2017-07-14 NOTE — LETTER
June 28, 2017      Karsten Christy  [address]   [place] [address]              Dear Karsten Christy,    Thank you for choosing [facility]. You are scheduled for the following service.     Date:  Friday, July 14             Procedure:  COLONOSCOPY  Doctor:        Tomás   Arrival Time:  10am *check in at Emergency/Endoscopy desk*  Procedure Time:  10:30am    Location:   [facility]        Endoscopy Department, First Floor (Enter through ER Doors) *        [address] [place], [place] [address]      [phone] or [phone] () to reschedule        Colonoscopy is the most accurate test to detect colon polyps and colon cancer; and the only test where polyps can be removed. During this procedure, a doctor examines the lining of your large intestine and rectum through a flexible tube.           Transportation  Arrange for a ride for the day of your procedure with a responsible adult.  A taxi ride is not an option unless you are accompanied by a responsible adult. If you fail to arrange transportation with a responsible adult, your procedure will be cancelled and rescheduled.    MIRALAX -GATORADE  PREP    Purchase the following supplies at your local pharmacy:  - 2 (two) bisacodyl tablets: each tablet contains 5 mg.  (Dulcolax  laxative NOT Dulcolax  stool softener)   - 1 (one) 8.3 oz bottle of Polyethylene Glycol (PEG) 3350 Powder   (MiraLAX , Smooth LAX , ClearLAX  or equivalent)  - 64 oz Gatorade    Regular Gatorade, Gatorade G2 , Powerade , Powerade Zero  or Pedialyte  is acceptable. Red colored flavors are not allowed; all other colors (yellow, green, orange, purple and blue) are okay. It is also okay to buy two 2.12 oz packets of powdered Gatorade that can be mixed with water to a total volume of 64 oz of liquid.  - 1 (one) 10 oz bottle of Magnesium Citrate (Red colored flavors are not allowed)  It is also okay for you to use a 0.5 oz  package of powdered magnesium citrate (17 g) mixed with 10 oz of water.      PREPARATION FOR COLONOSCOPY    7 days before:    Discontinue fiber supplements and medications containing iron. This includes Metamucil  and Fibercon ; and multivitamins with iron.  3 days before:    Begin a low-fiber diet. A low-fiber diet helps making the cleanout more effective.     Examples of a low-fiber diet include (but are not limited to): white bread, white rice, pasta, crackers, fish, chicken, eggs, ground beef, creamy peanut butter, cooked/steamed/boiled vegetables, canned fruit, bananas, melons, milk, plain yogurt cheese, salad dressing and other condiments.     The following are not allowed on a low-fiber diet: seeds, nuts, popcorn, bran, whole wheat, corn, quinoa, raw fruits and vegetables, berries and dried fruit, beans and lentils.    For additional details on low-fiber diet, please refer to the table on the last page.  2 days before:    Continue the low-fiber diet.     Drink at least 8 glasses of water throughout the day.     Stop eating solid foods at 11:45 pm.  1 day before:    In the morning: begin a clear liquid diet (liquids you can see through).     Examples of a clear liquid diet include: water, clear broth or bouillon, Gatorade, Pedialyte or Powerade, carbonated and non-carbonated soft drinks (Sprite , 7-Up , ginger ale), strained fruit juices without pulp (apple, white grape, white cranberry), Jell-O  and popsicles.     The following are not allowed on a clear liquid diet: red liquids, alcoholic beverages, coffee, dairy products (milk, creamer, and yogurt), protein shakes, creamy broths, juice with pulp and chewing tobacco.    At noon: take 2 (two) bisacodyl tablets     At 4 (and no later than 6pm): start drinking the Miralax-Gatorade preparation (8.3 oz of Miralax mixed with 64 oz of Gatorade in a large pitcher). Drink 1(one) 8 oz glass every 15 minutes thereafter, until the mixture is gone.    COLON CLEANSING  TIPS: drink adequate amounts of fluids before and after your colon cleansing to prevent dehydration. Stay near a toilet because you will have diarrhea. Even if you are sitting on the toilet, continue to drink the cleansing solution every 15 minutes. If you feel nauseous or vomit, rinse your mouth with water, take a 15 to 30-minute-break and then continue drinking the solution. You will be uncomfortable until the stool has flushed from your colon (in about 2 to 4 hours). You may feel chilled.    Day of your procedure  You may take all of your morning medications including blood pressure medications, blood thinners (if you have not been instructed to stop these by our office), methadone, anti-seizure medications with sips of water 3 hours prior to your procedure or earlier. Do not take insulin or vitamins prior to your procedure. Continue the clear liquid diet.   4 hours prior: drink 10 oz of magnesium citrate. It may be easier to drink it with a straw.    STOP consuming all liquids after that.     Do not take anything by mouth during this time.     Allow extra time to travel to your procedure as you may need to stop and use a restroom along the way.  You are ready for the procedure, if you followed all instructions and your stool is no longer formed, but clear or yellow liquid. If you are unsure whether your colon is clean, please call our office at [phone] before you leave for your appointment.  Bring the following to your procedure:  - Insurance Card/Photo ID.   - List of current medications including over-the-counter medications and supplements.   - Your rescue inhaler if you currently use one to control asthma.      Canceling or rescheduling your appointment:   If you must cancel or reschedule your appointment, please call [phone] as soon as possible.      COLONOSCOPY PRE-PROCEDURE CHECKLIST  If you have diabetes, ask your regular doctor for diet and medication restrictions.  If you take an  anticoagulant or anti-platelet medication (such as Coumadin , Lovenox , Pradaxa , Xarelto , Eliquis , etc.), please call your primary doctor for advice on holding this medication.  If you take aspirin you may continue to do so.  If you are or may be pregnant, please discuss the risks and benefits of this procedure with your doctor.          What happens during a colonoscopy?    Plan to spend up to two hours, starting at registration time, at the endoscopy center the day of your procedure. The colonoscopy takes an average of 15 to 30 minutes. Recovery time is about 30 minutes.    Before the exam:    You will change into a gown.    Your medical history and medication list will be reviewed with you, unless that has been done over the phone prior to the procedure.     A nurse will insert an intravenous (IV) line into your hand or arm.    The doctor will meet with you and will give you a consent form to sign.    During the exam:     Medicine will be given through the IV line to help you relax.     Your heart rate and oxygen levels will be monitored. If your blood pressure is low, you may be given fluids through the IV line.     The doctor will insert a flexible hollow tube, called a colonoscope, into your rectum. The scope will be advanced slowly through the large intestine (colon).    You may have a feeling of fullness or pressure.     If an abnormal tissue or a polyp is found, the doctor may remove it through the endoscope for closer examination, or biopsy. Tissue removal is painless    After the exam:           Any tissue samples removed during the exam will be sent to a lab for evaluation. It may take 5-7 working days for you to be notified of the results.     A nurse will provide you with complete discharge instructions before you leave the endoscopy center. Be sure to ask the nurse for specific instructions if you take blood thinners such as Aspirin, Coumadin or Plavix.     The doctor will prepare a full report for  you and for the physician who referred you for the procedure.     Your doctor will talk with you about the initial results of your exam.      Medication given during the exam will prohibit you from driving for the rest of the day.     Following the exam, you may resume your normal diet. Your first meal should be light, no greasy foods. Avoid alcohol until the next day.     You may resume your regular activities the day after the procedure.     LOW-FIBER DIET    Foods RECOMMENDED Foods to AVOID   Breads, Cereal, Rice and Pasta:   White bread, rolls, biscuits, croissant and zoey toast.   Waffles, Tajik toast and pancakes.   White rice, noodles, pasta, macaroni and peeled cooked potatoes.   Plain crackers and saltines.   Cooked cereals: farina, cream of rice.   Cold cereals: Puffed Rice , Rice Krispies , Corn Flakes  and Special K    Breads, Cereal, Rice and Pasta:   Breads or rolls with nuts, seeds or fruit.   Whole wheat, pumpernickel, rye breads and cornbread.   Potatoes with skin, brown or wild rice, and kasha (buckwheat).     Vegetables:   Tender cooked and canned vegetables without seeds: carrots, asparagus tips, green or wax beans, pumpkin, spinach, lima beans. Vegetables:   Raw or steamed vegetables.   Vegetables with seeds.   Sauerkraut.   Winter squash, peas, broccoli, Brussel sprouts, cabbage, onions, cauliflower, baked beans, peas and corn.   Fruits:   Strained fruit juice.   Canned fruit, except pineapple.   Ripe bananas and melon. Fruits:   Prunes and prune juice.   Raw fruits.   Dried fruits: figs, dates and raisins.   Milk/Dairy:   Milk: plain or flavored.   Yogurt, custard and ice cream.   Cheese and cottage cheese Milk/Dairy:     Meat and other proteins:   ground, well-cooked tender beef, lamb, ham, veal, pork, fish, poultry and organ meats.   Eggs.   Peanut butter without nuts. Meat and other proteins:   Tough, fibrous meats with gristle.   Dry beans, peas and lentils.   Peanut butter with  nuts.   Tofu.   Fats, Snack, Sweets, Condiments and Beverages:   Margarine, butter, oils, mayonnaise, sour cream and salad dressing, plain gravy.   Sugar, hard candy, clear jelly, honey and syrup.   Spices, cooked herbs, bouillon, broth and soups made with allowed vegetable, ketchup and mustard.   Coffee, tea and carbonated drinks.   Plain cakes, cookies and pretzels.   Gelatin, plain puddings, custard, ice cream, sherbet and popsicles. Fats, Snack, Sweets, Condiments and Beverages:   Nuts, seeds and coconut.   Jam, marmalade and preserves.   Pickles, olives, relish and horseradish.   All desserts containing nuts, seeds, dried fruit and coconut; or made from whole grains or bran.   Candy made with nuts or seeds.   Popcorn.           DIRECTIONS TO THE ENDOSCOPY DEPARTMENT     From the north ([facility])  Take [address] South, exit on [address] [address]. Get into the left hand juan, turn left (east), go one-half mile to [address] and turn left. Go north to the first stoplight, take a right on [address] and follow it to the Emergency entrance.    From the south ([facility])  Take [address]N to the [address]E split and exit on [address] [address]. On [address] [address], turn left (west) to [address]. Turn right (north) on [address]. Go north to the first stoplight, take a right on [address] and follow it to the Emergency entrance.    From the east via 35E ([facility])  Take [address] south to [address] [address] exit. Turn right on [address] [address]. Go west to [address]. Turn right (north) on [address]. Go to the first stoplight, take a right and follow on [address] to the Emergency entrance.    From the east via [address] ([facility])  Take [address] to [address]. Turn left (south) on [address] to [address]. Turn left (east) on [address] and follow it to the Emergency entrance.    From the west via [address] ([place], [place])  Take [address]  east to [address]. Turn right (south) on [address] to [address]. Turn left (east) on [address] and follow it to the Emergency entrance.

## 2017-07-14 NOTE — DISCHARGE INSTRUCTIONS
Understanding Colon and Rectal Polyps     The colon has a smooth lining composed of millions of cells.     The colon (also called the large intestine) is a muscular tube that forms the last part of the digestive tract. It absorbs water and stores food waste. The colon is about 4 to 6 feet long. The rectum is the last 6 inches of the colon. The colon and rectum have a smooth lining composed of millions of cells. Changes in these cells can lead to growths in the colon that can become cancerous and should be removed.     When the Colon Lining Changes  Changes that occur in the cells that line the colon or rectum can lead to growths called polyps. Over a period of years, polyps can turn cancerous. Removing polyps early may prevent cancer from ever forming.      Polyps  Polyps are fleshy clumps of tissue that form on the lining of the colon or rectum. Small polyps are usually benign (not cancerous). However, over time, cells in a polyp can change and become cancerous. The larger a polyp grows, the more likely this is to happen. Also, certain types of polyps known as adenomatous polyps are considered premalignant. This means that they will almost always become cancerous if they re not removed.          Cancer  Almost all colorectal cancers start when polyp cells begin growing abnormally. As a cancerous tumor grows, it may involve more and more of the colon or rectum. In time, cancer can also grow beyond the colon or rectum and spread to nearby organs or to glands called lymph nodes. The cells can also travel to other parts of the body. This is known as metastasis. The earlier a cancerous tumor is removed, the better the chance of preventing its spread.        [id number] [facility], [address] [place] [address], [place] [address]. All rights reserved. This information is not intended as a substitute for professional medical care. Always follow your healthcare professional's instructions.

## 2017-07-14 NOTE — PROCEDURES
"PRE-PROCEDURE H&P    CHIEF COMPLAINT / REASON FOR PROCEDURE:  screening    PERTINENT HISTORY :    Past Medical History:   Diagnosis Date     CAD (coronary artery disease) 1-2016    PCI, two DAYANNA to proximal and mid LAD     Calculus of kidney      Esophageal reflux      HEARING LOSS NOISE-INDUCED 9/25/2005     History of blood transfusion     given during knee arthrotomy     Hypertension      Mixed hyperlipidemia 6/4/2005    Cardiac Risk Factors: male over 45, HTN, family history; goal LDL < 130; [place] 10-year Cardiac Risk Score of 12%      NONSPECIFIC MEDICAL HISTORY     \"growth\" on kidney seen on MRI     Post PTCA 3-    staged procedure-DAYANNA OM 2     SLEEP APNEA     treated surgically, uvulopharyngoplasty;not using CPAP     Stable Renal Cyst 11/26/2005    followed by Dr. Crum, [facility]     Type 2 diabetes mellitus without complications (H)     type 2     Uric acid nephrolithiasis       Past Surgical History:   Procedure Laterality Date     ANGIOGRAM  01/27/2016    Successful PCI with drug-eluting stent placement in the proximal and mid LAD     ARTHROSCOPY SHOULDER ROTATOR CUFF REPAIR  3/18/2014    Procedure: ARTHROSCOPY SHOULDER ROTATOR CUFF REPAIR;  Left shoulder arthroscopic assessment, subacromial decompression, biceps tenodesis,  arthroscopic rotator cuff repair       ENT SURGERY      uppp, tonsils ,septoplasty     EYE SURGERY      lasik     GENITOURINARY SURGERY      surg for kidney stone     SURGICAL HISTORY OF -       Nissen Fundoplication     SURGICAL HISTORY OF -       ACL repair     SURGICAL HISTORY OF -       Uvuloplasty     SURGICAL HISTORY OF -       right shoulder arthroplasty and rotator cuff reconstruction     SURGICAL HISTORY OF -       arthroscopy of left knee         Bleeding tendencies:  No    Relevant Family History:  NONE     Relevant Social History:  NONE      A relevant review of systems was performed and was negative      ALLERGIES/SENSITIVITIES:   Allergies   Allergen " Reactions     Percocet [Oxycodone-Acetaminophen]      hallucinations       CURRENT MEDICATIONS:   No current outpatient prescriptions on file.        PRE-SEDATION ASSESSMENT:    Lung Exam:  normal  Heart Exam:  normal  Airway Exam: normal  Previous reaction to anesthesia/sedation:   No  Sedation plan based on assessment: Moderate (conscious) sedation  ASA Classification:  2 - Mild systemic disease        IMPRESSION:  screening    PLAN:  colonoscopy     Gabriella England  [facility]  Office: [phone]

## 2017-07-17 LAB — COPATH REPORT: NORMAL

## 2017-07-25 ENCOUNTER — TELEPHONE (OUTPATIENT)
Dept: PEDIATRICS | Facility: CLINIC | Age: 67
End: 2017-07-25

## 2017-07-25 NOTE — TELEPHONE ENCOUNTER
Panel Management Review      Patient has the following on his problem list:     Diabetes    ASA: Passed    Last A1C  Lab Results   Component Value Date    A1C 11.3 05/05/2017    A1C 8.7 12/20/2016    A1C 8.0 10/28/2016    A1C 7.9 01/27/2016    A1C 7.3 09/25/2015     A1C tested: FAILED    Last LDL:    Lab Results   Component Value Date    CHOL  12/20/2016     Canceled, Test credited   Charge credited  CORRECTED ON 12/22 AT 0930: PREVIOUSLY REPORTED        Lab Results   Component Value Date    HDL  12/20/2016     Canceled, Test credited   Charge credited  CORRECTED ON 12/22 AT 0930: PREVIOUSLY REPORTED AS 38       Lab Results   Component Value Date    LDL 56 12/20/2016     Lab Results   Component Value Date    TRIG  12/20/2016     Canceled, Test credited   Charge credited   Fasting specimen  CORRECTED ON 12/22 AT 0930: PREVIOUSLY REPORTED  Borderline high:  150 199   mg/dl High:             200 499 mg/dl Very high:       >499 mg/dl Fasting   specimen       Lab Results   Component Value Date    CHOLHDLRATIO 4.4 09/25/2015     Lab Results   Component Value Date    NHDL  12/20/2016     Not Calculated  CORRECTED ON 12/22 AT 0930: PREVIOUSLY REPORTED AS 90         Is the patient on a Statin? YES             Is the patient on Aspirin? YES    Medications     HMG CoA Reductase Inhibitors    rosuvastatin (CRESTOR) 40 MG tablet    Salicylates    aspirin 81 MG tablet          Last three blood pressure readings:  BP Readings from Last 3 Encounters:   07/14/17 118/70   05/05/17 118/70   10/28/16 108/68       Date of last diabetes office visit: 05/05/2017     Tobacco History:     History   Smoking Status     Former Smoker     Types: Cigars   Smokeless Tobacco     Never Used     Comment: cigar once per month- none lately             Composite cancer screening  Chart review shows that this patient is due/due soon for the following None  Summary:    Patient is due/failing the following:   A1C    Action needed:   Patient  needs office visit for A1-c  .    Type of outreach:    Appt scheduled. see other Te with mtm    Questions for provider review:    None                                                                                                                                    Pati NAJERA, THU,AAMA       Chart routed to Care Team .

## 2017-08-25 ENCOUNTER — OFFICE VISIT (OUTPATIENT)
Dept: PEDIATRICS | Facility: CLINIC | Age: 67
End: 2017-08-25
Payer: COMMERCIAL

## 2017-08-25 VITALS
DIASTOLIC BLOOD PRESSURE: 70 MMHG | SYSTOLIC BLOOD PRESSURE: 120 MMHG | HEART RATE: 76 BPM | RESPIRATION RATE: 14 BRPM | TEMPERATURE: 98 F | BODY MASS INDEX: 31.38 KG/M2 | WEIGHT: 221.8 LBS

## 2017-08-25 DIAGNOSIS — I25.10 CORONARY ARTERY DISEASE DUE TO LIPID RICH PLAQUE: ICD-10-CM

## 2017-08-25 DIAGNOSIS — I10 HTN (HYPERTENSION): ICD-10-CM

## 2017-08-25 DIAGNOSIS — Z71.89 GOALS OF CARE, COUNSELING/DISCUSSION: ICD-10-CM

## 2017-08-25 DIAGNOSIS — E78.5 HLD (HYPERLIPIDEMIA): ICD-10-CM

## 2017-08-25 DIAGNOSIS — E11.29 TYPE 2 DIABETES MELLITUS WITH OTHER DIABETIC KIDNEY COMPLICATION (H): ICD-10-CM

## 2017-08-25 DIAGNOSIS — I25.83 CORONARY ARTERY DISEASE DUE TO LIPID RICH PLAQUE: ICD-10-CM

## 2017-08-25 DIAGNOSIS — E11.29 TYPE 2 DIABETES MELLITUS WITH OTHER DIABETIC KIDNEY COMPLICATION (H): Primary | ICD-10-CM

## 2017-08-25 LAB
ALT SERPL W P-5'-P-CCNC: 23 U/L (ref 0–70)
ANION GAP SERPL CALCULATED.3IONS-SCNC: 8 MMOL/L (ref 3–14)
BUN SERPL-MCNC: 16 MG/DL (ref 7–30)
CALCIUM SERPL-MCNC: 8.7 MG/DL (ref 8.5–10.1)
CHLORIDE SERPL-SCNC: 105 MMOL/L (ref 94–109)
CHOLEST SERPL-MCNC: 112 MG/DL
CO2 SERPL-SCNC: 25 MMOL/L (ref 20–32)
CREAT SERPL-MCNC: 0.8 MG/DL (ref 0.66–1.25)
GFR SERPL CREATININE-BSD FRML MDRD: >90 ML/MIN/1.7M2
GLUCOSE SERPL-MCNC: 168 MG/DL (ref 70–99)
HBA1C MFR BLD: 9.9 % (ref 4.3–6)
HDLC SERPL-MCNC: 43 MG/DL
LDLC SERPL CALC-MCNC: 41 MG/DL
NONHDLC SERPL-MCNC: 69 MG/DL
POTASSIUM SERPL-SCNC: 4.3 MMOL/L (ref 3.4–5.3)
SODIUM SERPL-SCNC: 138 MMOL/L (ref 133–144)
TRIGL SERPL-MCNC: 140 MG/DL

## 2017-08-25 PROCEDURE — 83036 HEMOGLOBIN GLYCOSYLATED A1C: CPT | Performed by: INTERNAL MEDICINE

## 2017-08-25 PROCEDURE — 80048 BASIC METABOLIC PNL TOTAL CA: CPT | Performed by: INTERNAL MEDICINE

## 2017-08-25 PROCEDURE — 84460 ALANINE AMINO (ALT) (SGPT): CPT | Performed by: INTERNAL MEDICINE

## 2017-08-25 PROCEDURE — 99214 OFFICE O/P EST MOD 30 MIN: CPT | Performed by: INTERNAL MEDICINE

## 2017-08-25 PROCEDURE — 36415 COLL VENOUS BLD VENIPUNCTURE: CPT | Performed by: INTERNAL MEDICINE

## 2017-08-25 PROCEDURE — 80061 LIPID PANEL: CPT | Performed by: INTERNAL MEDICINE

## 2017-08-25 RX ORDER — GLIMEPIRIDE 1 MG/1
1 TABLET ORAL
Qty: 90 TABLET | Refills: 3 | Status: SHIPPED | OUTPATIENT
Start: 2017-08-25 | End: 2018-08-21

## 2017-08-25 RX ORDER — AMLODIPINE BESYLATE 5 MG/1
5 TABLET ORAL DAILY
Qty: 90 TABLET | Refills: 1 | Status: SHIPPED | OUTPATIENT
Start: 2017-08-25 | End: 2018-04-01

## 2017-08-25 NOTE — NURSING NOTE
"Chief Complaint   Patient presents with     Diabetes     would like medication renewals       Initial /70  Pulse 76  Temp 98  F (36.7  C) (Oral)  Resp 14  Wt 221 lb 12.8 oz (100.6 kg)  BMI 31.38 kg/m2 Estimated body mass index is 31.38 kg/(m^2) as calculated from the following:    Height as of 7/14/17: 5' 10.5\" (1.791 m).    Weight as of this encounter: 221 lb 12.8 oz (100.6 kg).  Medication Reconciliation: complete   Pati NAJERA, CMA,AAMA        "

## 2017-08-25 NOTE — PATIENT INSTRUCTIONS
Managing Diabetes: The A1C Test       Healthy red blood cells have some glucose stuck to them. A high A1C means that unhealthy amounts of glucose are stuck to the cells.   What is the A1C test?  Using your meter helps you track your blood sugar every day. But your glucose meter tells you the value at the time of testing only. You also need to know if your treatment plan is keeping you healthy over time. The hemoglobin A1C (or glycated hemoglobin) test can help. This test measures your average blood sugar level over a few months. A higher A1C result means that you have a higher risk of developing complications.  The A1C test  The A1C is a blood test done by your healthcare provider. You will likely have an A1C test every 3 to 6 months.  Your blood glucose goal  A1C has been shown as a percentage. But it can also be shown as a number representing the estimated Average Glucose (eAG). Unlike the A1C percentage, eAG is a number similar to the numbers listed on your daily glucose monitor. Both A1C and eAG measure the amount of glucose stuck to a protein called hemoglobin in red blood cells. Your healthcare provider will help you figure out what your ideal A1C or eAG should be. Your target number will depend on your age, general health, and other factors. If your current number is too high, your treatment plan may need changes, such as different medicines.  Sample results  Most people aim for an A1c lower than 7%. That s an eAG less than 154 mg/dL. Or, your healthcare provider may want you to aim for an A1C of 6%. That s an eAG of 126 mg/dL.     Glucose calculator  Visit http://professional.diabetes.org/diapro/glucose_calc for a chart that helps convert your A1C percentages into eAG numbers.   Date Last Reviewed: 6/1/2016 2000-2017 [employer]. [address] [place] [address], [place] [address]. All rights reserved. This information is not intended as a substitute for professional medical care. Always follow your  healthcare professional's instructions.

## 2017-08-25 NOTE — PROGRESS NOTES
SUBJECTIVE:                                                    Karsten Christy is a 67 year old male who presents to clinic today for the following health issues:    Diabetes Follow-up      Patient is checking blood sugars: very rare. Maintaining good eating habits    Diabetic concerns: None     Symptoms of hypoglycemia (low blood sugar): none     Paresthesias (numbness or burning in feet) or sores: No     Date of last diabetic eye exam: 2016 and scheduled for up coming eye appt    Hyperlipidemia Follow-Up      Rate your low fat/cholesterol diet?: good    Taking statin?  Yes, no muscle aches from statin    Other lipid medications/supplements?:  none    Hypertension Follow-up      Outpatient blood pressures are not being checked.    Low Salt Diet: low salt        Amount of exercise or physical activity: None    Problems taking medications regularly: No    Medication side effects: none  Diet: regular (no restrictions), low salt, low fat/cholesterol, carbohydrate counting and monitor sugar intake      patient doing well. feels better     Problem list and histories reviewed & adjusted, as indicated.  Additional history:   Patient Active Problem List    Diagnosis Date Noted     CAD (coronary artery disease)      Priority: Medium     Echo 1-26-16, EF 55-60%. Cath 01-27-16, DAYANNA x 2 to proximal and mid LAD       Hypertension      Priority: Medium     Type 2 diabetes mellitus with other diabetic kidney complication; goal A1c < 7% 10/15/2015     Priority: Medium     Blood pressure goal < 140/90 10/01/2013     Priority: Medium     Hyperlipidemia LDL goal <100 02/10/2010     Priority: Medium     Localized osteoarthrosis, lower leg 12/18/2008     Priority: Medium     Problem list name updated by automated process. Provider to review       Stable Renal Cyst 11/26/2005     Priority: Medium     followed by [facility]       HEARING LOSS NOISE-INDUCED 09/25/2005     Priority: Medium     Calculus of kidney 11/13/2004      Priority: Medium     Obstructive sleep apnea 11/13/2004     Priority: Medium     PSG of 1/2011 with AHI = 73; optimal nasal CPAP pressure = 11 cm H2O       Esophageal reflux 11/13/2004     Priority: Medium     Social History   Substance Use Topics     Smoking status: Former Smoker     Types: Cigars     Smokeless tobacco: Never Used      Comment: cigar once per month- none lately     Alcohol use 0.0 oz/week     0 Standard drinks or equivalent per week      Comment: 3 drinks per week     Family History   Problem Relation Age of Onset     Arthritis Father      GOUT     C.A.D. Father      ASCVD, age of onset over 80     C.A.D. Mother      ANGINA; premature onset     C.A.D. Maternal Grandfather      fatal MI under age 60     Prostate Cancer Paternal Uncle      onset over age 65     Cancer - colorectal No family hx of      Colon Cancer No family hx of        ROS:  A complete 10 point review of systems was taken and negative except for those noted in the subjective/HPI section(s) above       OBJECTIVE:                                                    /70  Pulse 76  Temp 98  F (36.7  C) (Oral)  Resp 14  Wt 221 lb 12.8 oz (100.6 kg)  BMI 31.38 kg/m2   Constitutional: healthy, alert and no distress  HEENT: normocephalic and atrumatic, mucous membranes moist, op clear, mucous membranes moist, neck supple   Cardiovascular: regular rate and rhythm no rubs, gallops or murmurs normal S1/S2; no S3 or S4  Respiratory: clear to auscultation bilaterally in all lung fields, normal insp/exp effort. Good air movement  Gastrointestinal: Abdomen soft, non-tender. BS normal. No masses, organomegaly  Musculoskeletal: extremities normal- no gross deformities noted, gait normal and normal muscle tone  Skin: no suspicious lesions or rashes  Neurologic: Gait normal. Reflexes normal and symmetric. Sensation grossly WNL.  Psychiatric: mentation appears normal and affect normal/bright      Lab Results   Component Value Date    A1C 9.9  "08/25/2017    A1C 11.3 05/05/2017    A1C 8.7 12/20/2016    A1C 8.0 10/28/2016    A1C 7.9 01/27/2016          ASSESSMENT/PLAN:                                                    (E11.29) Type 2 diabetes mellitus with other diabetic kidney complication; goal A1c < 7%  (primary encounter diagnosis)  Comment: discussed with patient (or patient's parents/caregiver) pathophysiology of condition and treatment options.  A1c not at goal, but improved from last check! plan to continue cares and plan, add amryl to regimen. New medication(s) indication(s), adverse effects, risks and benefits discussed. recheck in 3 months. consider MTM evaluation if not at goal at that time. reviewed labs, medications, diet ,etc.  Patient verbalized understanding and is agreeable to this plan.   Plan: glimepiride (AMARYL) 1 MG tablet, **A1C FUTURE         anytime        (I25.10,  I25.83) Coronary artery disease due to lipid rich plaque  Comment: Well controlled on current medication(s). .jnrv   Plan: amLODIPine (NORVASC) 5 MG tablet, **A1C FUTURE         anytime          (Z71.89) Blood pressure goal < 140/90  Comment: Well controlled on current medication(s). discussed with patient (or patient's parents/caregiver) pathophysiology of condition and treatment options.  reviewed labs, medications, diet ,etc.        Estimated body mass index is 31.12 kg/(m^2) as calculated from the following:    Height as of 7/14/17: 5' 10.5\" (1.791 m).    Weight as of 7/14/17: 220 lb (99.8 kg).        Return to clinic as needed or if symptoms persist, change, worsen or if any new symptoms develop.    Nick Morton M.D.  Internal Medicine-Pediatrics            "

## 2017-08-25 NOTE — MR AVS SNAPSHOT
After Visit Summary   8/25/2017    Karsten Christy    MRN: [id number]           Patient Information     Date Of Birth          1950        Visit Information        Provider Department      8/25/2017 10:00 AM Nick Morton MD [facility] [place]        Today's Diagnoses     Type 2 diabetes mellitus with other diabetic kidney complication; goal A1c < 7%    -  1    Coronary artery disease due to lipid rich plaque        Blood pressure goal < 140/90          Care Instructions      Managing Diabetes: The A1C Test       Healthy red blood cells have some glucose stuck to them. A high A1C means that unhealthy amounts of glucose are stuck to the cells.   What is the A1C test?  Using your meter helps you track your blood sugar every day. But your glucose meter tells you the value at the time of testing only. You also need to know if your treatment plan is keeping you healthy over time. The hemoglobin A1C (or glycated hemoglobin) test can help. This test measures your average blood sugar level over a few months. A higher A1C result means that you have a higher risk of developing complications.  The A1C test  The A1C is a blood test done by your healthcare provider. You will likely have an A1C test every 3 to 6 months.  Your blood glucose goal  A1C has been shown as a percentage. But it can also be shown as a number representing the estimated Average Glucose (eAG). Unlike the A1C percentage, eAG is a number similar to the numbers listed on your daily glucose monitor. Both A1C and eAG measure the amount of glucose stuck to a protein called hemoglobin in red blood cells. Your healthcare provider will help you figure out what your ideal A1C or eAG should be. Your target number will depend on your age, general health, and other factors. If your current number is too high, your treatment plan may need changes, such as different medicines.  Sample results  Most people aim for an A1c lower than 7%. That s an eAG  less than 154 mg/dL. Or, your healthcare provider may want you to aim for an A1C of 6%. That s an eAG of 126 mg/dL.     Glucose calculator  Visit http://professional.diabetes.org/diapro/glucose_calc for a chart that helps convert your A1C percentages into eAG numbers.   Date Last Reviewed: 6/1/2016 2000-2017 [employer]. [address] [place] [address], [place] [address]. All rights reserved. This information is not intended as a substitute for professional medical care. Always follow your healthcare professional's instructions.                Follow-ups after your visit        Your next 10 appointments already scheduled     Oct 13, 2017  7:30 AM CDT   LAB with RU LAB   [facility] ([facility])    [address] [place] [address]  [facility] [address] [phone]           Patient must bring picture ID. Patient should be prepared to give a urine specimen  Please do not eat 10-12 hours before your appointment if you are coming in fasting for labs on lipids, cholesterol, or glucose (sugar). Pregnant women should follow their Care Team instructions. Water with medications is okay. Do not drink coffee or other fluids. If you have concerns about taking  your medications, please ask at office or if scheduling via [employer], send a message by clicking on Secure Messaging, Message Your Care Team.            Oct 13, 2017  8:15 AM CDT   Return Visit with Renato Loaiza MD   [facility] ([email or web address])    [address] [place] [address]  [facility] [id number] [phone]            Nov 10, 2017 10:00 AM CST   Office Visit with Nick Morton MD   [facility] [place] ([facility] [place])    [address] [place] [address]  [address]  [place] [id number] [phone]           Bring a current list of meds and any records pertaining to this visit. For Physicals, please bring immunization records and any  forms needing to be filled out. Please arrive 10 minutes early to complete paperwork.              Future tests that were ordered for you today     Open Future Orders        Priority Expected Expires Ordered    **A1C FUTURE anytime Routine 8/25/2017 8/25/2018 8/25/2017            Who to contact     If you have questions or need follow up information about today's clinic visit or your schedule please contact [facility] [place] directly at [phone].  Normal or non-critical lab and imaging results will be communicated to you by [employer], letter or phone within 4 business days after the clinic has received the results. If you do not hear from us within 7 days, please contact the clinic through [employer] or phone. If you have a critical or abnormal lab result, we will notify you by phone as soon as possible.  Submit refill requests through [employer] or call your pharmacy and they will forward the refill request to us. Please allow 3 business days for your refill to be completed.          Additional Information About Your Visit        [employer] Information     [employer] gives you secure access to your electronic health record. If you see a primary care provider, you can also send messages to your care team and make appointments. If you have questions, please call your primary care clinic.  If you do not have a primary care provider, please call [phone] and they will assist you.        Care EveryWhere ID     This is your Care EveryWhere ID. This could be used by other organizations to access your [place] medical records  [id number]        Your Vitals Were     Pulse Temperature Respirations BMI (Body Mass Index)          76 98  F (36.7  C) (Oral) 14 31.38 kg/m2         Blood Pressure from Last 3 Encounters:   08/25/17 120/70   07/14/17 118/70   05/05/17 118/70    Weight from Last 3 Encounters:   08/25/17 221 lb 12.8 oz (100.6 kg)   07/14/17 220 lb (99.8 kg)   05/05/17 225 lb 4.8 oz (102.2 kg)                 Today's  Medication Changes          These changes are accurate as of: 8/25/17 10:18 AM.  If you have any questions, ask your nurse or doctor.               Start taking these medicines.        Dose/Directions    glimepiride 1 MG tablet   Commonly known as:  AMARYL   Used for:  Type 2 diabetes mellitus with other diabetic kidney complication (H)   Started by:  Nick Morton MD        Dose:  1 mg   Take 1 tablet (1 mg) by mouth every morning (before breakfast)   Quantity:  90 tablet   Refills:  3            Where to get your medicines      These medications were sent to [facility] [id number] [place] [address] [address] [facility] [address]  [address], [facility] [address]     Phone:  [phone]     amLODIPine 5 MG tablet    glimepiride 1 MG tablet                Primary Care Provider Office Phone # Fax #    Nick Morton [phone] [address] [place] [address]        Equal Access to Services     [facility]: Hadii aad ku hadasho Soomaali, waaxda luqadaha, qaybta kaalmada adeegyada, waxay idiin hayaan adeeg kharademetrice laanthony . So [facility] [phone].    ATENCIÓN: Si habla español, tiene a barnes disposición servicios gratuitos de asistencia lingüística. [facility] [phone].    We comply with applicable federal civil rights laws and [place] laws. We do not discriminate on the basis of race, color, national origin, age, disability sex, sexual orientation or gender identity.            Thank you!     Thank you for choosing [facility]  for your care. Our goal is always to provide you with excellent care. Hearing back from our patients is one way we can continue to improve our services. Please take a few minutes to complete the written survey that you may receive in the mail after your visit with us. Thank you!             Your Updated Medication List - Protect others around you: Learn how to safely use, store and throw away your medicines at  www.disposemymeds.org.          This list is accurate as of: 8/25/17 10:18 AM.  Always use your most recent med list.                   Brand Name Dispense Instructions for use Diagnosis    amLODIPine 5 MG tablet    NORVASC    90 tablet    Take 1 tablet (5 mg) by mouth daily    Coronary artery disease due to lipid rich plaque       aspirin 81 MG tablet      Take 81 mg by mouth daily        blood glucose monitoring lancets     3 Box    change every 3 pokes.    Mixed hyperlipidemia       blood glucose monitoring test strip    FREESTYLE TEST STRIPS    100 strip    use as directed    Mixed hyperlipidemia       carvedilol 6.25 MG tablet    COREG    180 tablet    Take 1 tablet (6.25 mg) by mouth 2 times daily (with meals)    Coronary artery disease due to lipid rich plaque       clopidogrel 75 MG tablet    PLAVIX    90 tablet    Take 1 tablet (75 mg) by mouth daily    Coronary artery disease due to lipid rich plaque       glimepiride 1 MG tablet    AMARYL    90 tablet    Take 1 tablet (1 mg) by mouth every morning (before breakfast)    Type 2 diabetes mellitus with other diabetic kidney complication (H)       glucosamine-chondroitin 500-400 MG Caps per capsule      Take 1 capsule by mouth daily        lisinopril 5 MG tablet    PRINIVIL/ZESTRIL    90 tablet    Take 1 tablet (5 mg) by mouth daily    Coronary artery disease due to lipid rich plaque       metFORMIN 1000 MG tablet    GLUCOPHAGE    180 tablet    TAKE 1 TABLET(1000 MG) BY MOUTH TWICE DAILY WITH MEALS    Type 2 diabetes mellitus with other diabetic kidney complication (H)       Multi-vitamin Tabs tablet      Take 1 tablet by mouth daily        nitroGLYcerin 0.4 MG sublingual tablet    NITROSTAT    25 tablet    Place 1 tablet (0.4 mg) under the tongue every 5 minutes as needed for chest pain    Unstable angina (H)       rosuvastatin 40 MG tablet    CRESTOR    90 tablet    Take 1 tablet (40 mg) by mouth daily    Coronary artery disease due to lipid rich plaque        tadalafil 10 MG tablet    CIALIS    12 tablet    Take 1 tablet (10 mg) by mouth daily as needed for erectile dysfunction TAKEN AT LEAST 30 MINUTES BEFORE SEXUAL ACTIVITY    Impotence of organic origin       vitamin B complex with vitamin C Tabs tablet      Take 1 tablet by mouth daily        VITAMIN D3 PO      Take 2,000 Units by mouth daily

## 2017-09-07 ENCOUNTER — TRANSFERRED RECORDS (OUTPATIENT)
Dept: HEALTH INFORMATION MANAGEMENT | Facility: CLINIC | Age: 67
End: 2017-09-07

## 2017-10-09 ENCOUNTER — ALLIED HEALTH/NURSE VISIT (OUTPATIENT)
Dept: NURSING | Facility: CLINIC | Age: 67
End: 2017-10-09
Payer: COMMERCIAL

## 2017-10-09 ENCOUNTER — PRE VISIT (OUTPATIENT)
Dept: CARDIOLOGY | Facility: CLINIC | Age: 67
End: 2017-10-09

## 2017-10-09 DIAGNOSIS — Z23 NEED FOR PROPHYLACTIC VACCINATION AND INOCULATION AGAINST INFLUENZA: Primary | ICD-10-CM

## 2017-10-09 PROCEDURE — 90662 IIV NO PRSV INCREASED AG IM: CPT

## 2017-10-09 PROCEDURE — 90471 IMMUNIZATION ADMIN: CPT

## 2017-10-09 NOTE — MR AVS SNAPSHOT
After Visit Summary   10/9/2017    Karsten Christy    MRN: [id number]           Patient Information     Date Of Birth          1950        Visit Information        Provider Department      10/9/2017 10:00 AM EA NURSE AB [facility]        Today's Diagnoses     Need for prophylactic vaccination and inoculation against influenza    -  1       Follow-ups after your visit        Your next 10 appointments already scheduled     Oct 09, 2017 10:00 AM CDT   Nurse Only with SIDNEY NURSE AB   [facility] ([facility])    [address] [place] [address]  [address]  [place] [address] [phone]            Oct 13, 2017  7:30 AM CDT   LAB with RU LAB   [facility] ([facility])    [address] [place] [address]  [facility] [id number] [phone]           Patient must bring picture ID. Patient should be prepared to give a urine specimen  Please do not eat 10-12 hours before your appointment if you are coming in fasting for labs on lipids, cholesterol, or glucose (sugar). Pregnant women should follow their Care Team instructions. Water with medications is okay. Do not drink coffee or other fluids. If you have concerns about taking  your medications, please ask at office or if scheduling via [employer], send a message by clicking on Secure Messaging, Message Your Care Team.            Oct 13, 2017  8:15 AM CDT   Return Visit with Renato Loaiza MD   [facility] ([facility])    [address] [place] [address]  [facility] [address] [phone]            Nov 10, 2017 10:00 AM CST   Office Visit with Nick Morton MD   [facility] [place] ([facility])    [address] [place] [address]  [address]  [place] [address] [phone]           Bring a current list of meds and any records pertaining to this visit. For Physicals, please bring immunization  records and any forms needing to be filled out. Please arrive 10 minutes early to complete paperwork.              Who to contact     If you have questions or need follow up information about today's clinic visit or your schedule please contact [facility] [place] directly at [phone].  Normal or non-critical lab and imaging results will be communicated to you by MyChart, letter or phone within 4 business days after the clinic has received the results. If you do not hear from us within 7 days, please contact the clinic through [facility] [employer] [facility] or phone. If you have a critical or abnormal lab result, we will notify you by phone as soon as possible.  Submit refill requests through [employer] [place] or call your pharmacy and they will forward the refill request to us. Please allow 3 business days for your refill to be completed.          Additional Information About Your Visit        [facility] Information     [employer] gives you secure access to your electronic health record. If you see a primary care provider, you can also send messages to your care team and make appointments. If you have questions, please call your primary care clinic.  If you do not have a primary care provider, please call [phone] and they will assist you.        Care EveryWhere ID     This is your Care EveryWhere ID. This could be used by other organizations to access your [place] medical records  [id number]         Blood Pressure from Last 3 Encounters:   08/25/17 120/70   07/14/17 118/70   05/05/17 118/70    Weight from Last 3 Encounters:   08/25/17 221 lb 12.8 oz (100.6 kg)   07/14/17 220 lb (99.8 kg)   05/05/17 225 lb 4.8 oz (102.2 kg)              We Performed the Following     FLU VACCINE, INCREASED ANTIGEN, PRESV FREE, AGE 65+ [69661]     Vaccine Administration, Initial [36360]        Primary Care Provider Office Phone # Fax #    Nick Morton [phone] [address] [place] [address]        Equal Access to  Services     [facility]: Hadii aad ku hadshawneelyndsey Elainemarybeth, waaxda luqadaha, qaybta kaalmada spsumandanyel, julia degroot . So [facility] [phone].    ATENCIÓN: Si habla solis, tiene a barnes disposición servicios gratuitos de asistencia lingüística. Llame al [phone].    We comply with applicable federal civil rights laws and [place] laws. We do not discriminate on the basis of race, color, national origin, age, disability, sex, sexual orientation, or gender identity.            Thank you!     Thank you for choosing [facility] [place]  for your care. Our goal is always to provide you with excellent care. Hearing back from our patients is one way we can continue to improve our services. Please take a few minutes to complete the written survey that you may receive in the mail after your visit with us. Thank you!             Your Updated Medication List - Protect others around you: Learn how to safely use, store and throw away your medicines at www.disposemymeds.org.          This list is accurate as of: 10/9/17  9:50 AM.  Always use your most recent med list.                   Brand Name Dispense Instructions for use Diagnosis    amLODIPine 5 MG tablet    NORVASC    90 tablet    Take 1 tablet (5 mg) by mouth daily    Coronary artery disease due to lipid rich plaque       aspirin 81 MG tablet      Take 81 mg by mouth daily        blood glucose monitoring lancets     3 Box    change every 3 pokes.    Mixed hyperlipidemia       blood glucose monitoring test strip    FREESTYLE TEST STRIPS    100 strip    use as directed    Mixed hyperlipidemia       carvedilol 6.25 MG tablet    COREG    180 tablet    Take 1 tablet (6.25 mg) by mouth 2 times daily (with meals)    Coronary artery disease due to lipid rich plaque       clopidogrel 75 MG tablet    PLAVIX    90 tablet    Take 1 tablet (75 mg) by mouth daily    Coronary artery disease due to lipid rich plaque       glimepiride 1 MG tablet    AMARYL     90 tablet    Take 1 tablet (1 mg) by mouth every morning (before breakfast)    Type 2 diabetes mellitus with other diabetic kidney complication       glucosamine-chondroitin 500-400 MG Caps per capsule      Take 1 capsule by mouth daily        lisinopril 5 MG tablet    PRINIVIL/ZESTRIL    90 tablet    Take 1 tablet (5 mg) by mouth daily    Coronary artery disease due to lipid rich plaque       metFORMIN 1000 MG tablet    GLUCOPHAGE    180 tablet    TAKE 1 TABLET(1000 MG) BY MOUTH TWICE DAILY WITH MEALS    Type 2 diabetes mellitus with other diabetic kidney complication       Multi-vitamin Tabs tablet      Take 1 tablet by mouth daily        nitroGLYcerin 0.4 MG sublingual tablet    NITROSTAT    25 tablet    Place 1 tablet (0.4 mg) under the tongue every 5 minutes as needed for chest pain    Unstable angina (H)       rosuvastatin 40 MG tablet    CRESTOR    90 tablet    Take 1 tablet (40 mg) by mouth daily    Coronary artery disease due to lipid rich plaque       tadalafil 10 MG tablet    CIALIS    12 tablet    Take 1 tablet (10 mg) by mouth daily as needed for erectile dysfunction TAKEN AT LEAST 30 MINUTES BEFORE SEXUAL ACTIVITY    Impotence of organic origin       vitamin B complex with vitamin C Tabs tablet      Take 1 tablet by mouth daily        VITAMIN D3 PO      Take 2,000 Units by mouth daily

## 2017-10-13 ENCOUNTER — OFFICE VISIT (OUTPATIENT)
Dept: CARDIOLOGY | Facility: CLINIC | Age: 67
End: 2017-10-13
Attending: INTERNAL MEDICINE
Payer: COMMERCIAL

## 2017-10-13 VITALS
HEIGHT: 70 IN | HEART RATE: 76 BPM | BODY MASS INDEX: 32.65 KG/M2 | WEIGHT: 228.1 LBS | SYSTOLIC BLOOD PRESSURE: 142 MMHG | DIASTOLIC BLOOD PRESSURE: 82 MMHG

## 2017-10-13 DIAGNOSIS — I25.10 CORONARY ARTERY DISEASE INVOLVING NATIVE CORONARY ARTERY OF NATIVE HEART WITHOUT ANGINA PECTORIS: Primary | ICD-10-CM

## 2017-10-13 PROCEDURE — 99214 OFFICE O/P EST MOD 30 MIN: CPT | Performed by: INTERNAL MEDICINE

## 2017-10-13 RX ORDER — CYANOCOBALAMIN (VITAMIN B-12) 500 MCG
400 LOZENGE ORAL 3 TIMES DAILY
COMMUNITY

## 2017-10-13 RX ORDER — NIACINAMIDE 500 MG
500 TABLET ORAL DAILY
COMMUNITY

## 2017-10-13 RX ORDER — NIACIN 500 MG
500 TABLET ORAL 2 TIMES DAILY WITH MEALS
COMMUNITY

## 2017-10-13 RX ORDER — GUAIFENESIN 400 MG/1
TABLET ORAL 2 TIMES DAILY
COMMUNITY

## 2017-10-13 RX ORDER — TAMSULOSIN HYDROCHLORIDE 0.4 MG/1
0.4 CAPSULE ORAL DAILY
COMMUNITY

## 2017-10-13 RX ORDER — LUTEIN 6 MG
TABLET ORAL 2 TIMES DAILY
COMMUNITY

## 2017-10-13 RX ORDER — PYRIDOXINE HCL (VITAMIN B6) 100 MG
TABLET ORAL DAILY
COMMUNITY

## 2017-10-13 RX ORDER — CYANOCOBALAMIN (VITAMIN B-12) 1000 MCG
TABLET, EXTENDED RELEASE ORAL DAILY
COMMUNITY

## 2017-10-13 NOTE — MR AVS SNAPSHOT
After Visit Summary   10/13/2017    Karsten Christy    MRN: [id number]           Patient Information     Date Of Birth          1950        Visit Information        Provider Department      10/13/2017 8:15 AM Brandt Pinon MD [facility]        Today's Diagnoses     Coronary artery disease involving native coronary artery of native heart without angina pectoris    -  1       Follow-ups after your visit        Additional Services     Follow-Up with Cardiologist                 Your next 10 appointments already scheduled     Nov 10, 2017 10:00 AM CST   Office Visit with Nick Morton MD   [facility]    [address] [place] [address]  [address]  [facility] [id number] [phone]           Bring a current list of meds and any records pertaining to this visit. For Physicals, please bring immunization records and any forms needing to be filled out. Please arrive 10 minutes early to complete paperwork.              Future tests that were ordered for you today     Open Future Orders        Priority Expected Expires Ordered    Follow-Up with Cardiologist Routine 4/11/2018 10/13/2018 10/13/2017            Who to contact     If you have questions or need follow up information about today's clinic visit or your schedule please contact [facility] directly at [phone].  Normal or non-critical lab and imaging results will be communicated to you by MyChart, letter or phone within 4 business days after the clinic has received the results. If you do not hear from us within 7 days, please contact the clinic through MyChart or phone. If you have a critical or abnormal lab result, we will notify you by phone as soon as possible.  Submit refill requests through MYFLY or call your pharmacy and they will forward the refill request to us. Please allow 3 business days for your refill  "to be completed.          Additional Information About Your Visit        [facility] Information     [employer] gives you secure access to your electronic health record. If you see a primary care provider, you can also send messages to your care team and make appointments. If you have questions, please call your primary care clinic.  If you do not have a primary care provider, please call [phone] and they will assist you.        Care EveryWhere ID     This is your Care EveryWhere ID. This could be used by other organizations to access your [facility] medical records  [id number]        Your Vitals Were     Pulse Height BMI (Body Mass Index)             76 1.778 m (5' 10\") 32.73 kg/m2          Blood Pressure from Last 3 Encounters:   10/13/17 142/82   08/25/17 120/70   07/14/17 118/70    Weight from Last 3 Encounters:   10/13/17 103.5 kg (228 lb 1.6 oz)   08/25/17 100.6 kg (221 lb 12.8 oz)   07/14/17 99.8 kg (220 lb)              We Performed the Following     Follow-Up with Cardiologist        Primary Care Provider Office Phone # Fax #    Nick Morton [phone] [address] [place] [address]        Equal Access to Services     [facility] : Hadii aad ku hadasho Soomaali, waaxda luqadaha, qaybta kaalmada adeegyada, waxay juan ackermann sp martínez. So [facility] [phone].    ATENCIÓN: Si habla español, tiene a barnes disposición servicios gratuitos de asistencia lingüística. Llame al [phone].    We comply with applicable federal civil rights laws and [place] laws. We do not discriminate on the basis of race, color, national origin, age, disability, sex, sexual orientation, or gender identity.            Thank you!     Thank you for choosing [facility] [place]  for your care. Our goal is always to provide you with excellent care. Hearing back from our patients is one way we can continue to improve our services. Please take a few " minutes to complete the written survey that you may receive in the mail after your visit with us. Thank you!             Your Updated Medication List - Protect others around you: Learn how to safely use, store and throw away your medicines at www.disposemymeds.org.          This list is accurate as of: 10/13/17  8:58 AM.  Always use your most recent med list.                   Brand Name Dispense Instructions for use Diagnosis    amLODIPine 5 MG tablet    NORVASC    90 tablet    Take 1 tablet (5 mg) by mouth daily    Coronary artery disease due to lipid rich plaque       aspirin 81 MG tablet      Take 81 mg by mouth daily        B-12 1000 MCG Caps      Take by mouth daily        B-6 100 MG Tabs      Take by mouth daily        blood glucose monitoring lancets     3 Box    change every 3 pokes.    Mixed hyperlipidemia       blood glucose monitoring test strip    FREESTYLE TEST STRIPS    100 strip    use as directed    Mixed hyperlipidemia       carvedilol 6.25 MG tablet    COREG    180 tablet    Take 1 tablet (6.25 mg) by mouth 2 times daily (with meals)    Coronary artery disease due to lipid rich plaque       CHILDRENS GUMMIES PO      Take by mouth 2 times daily        CINNAMON PO      Take 1,000 mg by mouth 2 times daily        clopidogrel 75 MG tablet    PLAVIX    90 tablet    Take 1 tablet (75 mg) by mouth daily    Coronary artery disease due to lipid rich plaque       FIBER ADULT GUMMIES PO      Take 4 g by mouth 2 times daily        glimepiride 1 MG tablet    AMARYL    90 tablet    Take 1 tablet (1 mg) by mouth every morning (before breakfast)    Type 2 diabetes mellitus with other diabetic kidney complication       glucosamine-chondroitin 500-400 MG Caps per capsule      Take 1 capsule by mouth daily        Green Tea (Camillia sinensis) 315 MG Caps      Take by mouth 2 times daily        guaiFENesin 400 MG Tabs      Take by mouth 2 times daily        lisinopril 5 MG tablet    PRINIVIL/ZESTRIL    90 tablet     Take 1 tablet (5 mg) by mouth daily    Coronary artery disease due to lipid rich plaque       Lutein 20 MG Tabs      Take by mouth 2 times daily        metFORMIN 1000 MG tablet    GLUCOPHAGE    180 tablet    TAKE 1 TABLET(1000 MG) BY MOUTH TWICE DAILY WITH MEALS    Type 2 diabetes mellitus with other diabetic kidney complication       * Multi-vitamin Tabs tablet      Take 1 tablet by mouth daily        * HAIR SKIN AND NAILS FORMULA PO      Take by mouth 2 times daily        niacin 500 MG tablet      Take 500 mg by mouth 2 times daily (with meals)        niacinamide 500 MG tablet      Take 500 mg by mouth daily        nitroGLYcerin 0.4 MG sublingual tablet    NITROSTAT    25 tablet    Place 1 tablet (0.4 mg) under the tongue every 5 minutes as needed for chest pain    Unstable angina (H)       rosuvastatin 40 MG tablet    CRESTOR    90 tablet    Take 1 tablet (40 mg) by mouth daily    Coronary artery disease due to lipid rich plaque       Selenium 200 MCG Caps      Take by mouth daily        tadalafil 10 MG tablet    CIALIS    12 tablet    Take 1 tablet (10 mg) by mouth daily as needed for erectile dysfunction TAKEN AT LEAST 30 MINUTES BEFORE SEXUAL ACTIVITY    Impotence of organic origin       tamsulosin 0.4 MG capsule    FLOMAX     Take 0.4 mg by mouth daily        VIACTIV PO      Take by mouth 2 times daily        vitamin B complex with vitamin C Tabs tablet      Take 1 tablet by mouth daily        VITAMIN D3 PO      Take 1,000 Units by mouth 2 times daily        vitamin E 400 UNITS Tabs      Take 400 Units by mouth 3 times daily        * Notice:  This list has 2 medication(s) that are the same as other medications prescribed for you. Read the directions carefully, and ask your doctor or other care provider to review them with you.

## 2017-10-13 NOTE — LETTER
10/13/2017    Nick Morton MD  [address] [place] [address]    RE: Karsten Christy       Dear Colleague,    I had the pleasure of seeing Karsten Christy in the [facility].    CARDIOLOGY VISIT    REASON FOR VISIT: f/u CAD    SUBJECTIVE:  67 year male seen for follow-up of coronary artery disease.      Echo January 2016 showed EF 60%, no wall motion abnormality, normal right ventricle, no valve disease.  In January 2016 he underwent drug-eluting stent to the proximal to mid LAD. Repeat angiogram March 2016 with drug-eluting stent to the OM2, FFR of distal circumflex was 0.95, OM1 of 0.84, dominant RCA with up to 40% stenosis.     In recent months he has been doing well for the most part.  He does some yard work and other walking with no exertional chest pain.  When he goes up a flight of stairs he feels a little more short of breath.  He also has a random twinge of pain in his left chest, but not with exertion.  He denies any lightheadedness, dizziness, or syncope.  He does not do any dedicated regular exercise.    MEDICATIONS:  Current Outpatient Prescriptions   Medication     glimepiride (AMARYL) 1 MG tablet     amLODIPine (NORVASC) 5 MG tablet     rosuvastatin (CRESTOR) 40 MG tablet     metFORMIN (GLUCOPHAGE) 1000 MG tablet     lisinopril (PRINIVIL,ZESTRIL) 5 MG tablet     carvedilol (COREG) 6.25 MG tablet     clopidogrel (PLAVIX) 75 MG tablet     nitroglycerin (NITROSTAT) 0.4 MG SL tablet     tadalafil (CIALIS) 10 MG tablet     blood glucose monitoring (FREESTYLE) lancets     blood glucose (FREESTYLE TEST STRIPS) test strip     aspirin 81 MG tablet     multivitamin, therapeutic with minerals (MULTI-VITAMIN) TABS     vitamin  B complex with vitamin C (VITAMIN  B COMPLEX) TABS     glucosamine-chondroitin 500-400 MG CAPS     Cholecalciferol (VITAMIN D3 PO)     No current facility-administered medications for this visit.        ALLERGIES:  Allergies   Allergen Reactions      "Percocet [Oxycodone-Acetaminophen]      hallucinations       REVIEW OF SYSTEMS:  Constitutional:  No weight loss, fever, chills, weakness or fatigue.  HEENT:  Eyes:  No visual loss, blurred vision, double vision or yellow sclerae. No hearing loss, sneezing, congestion, runny nose or sore throat.  Skin:  No rash or itching.  Cardiovascular: per HPI  Respiratory: per HPI  GI:  No anorexia, nausea, vomiting or diarrhea. No abdominal pain or blood.  :  No dysurea, hematuria  Neurologic:  No headache, dizziness, syncope, paralysis, ataxia, numbness or tingling in the extremities. No change in bowel or bladder control.  Musculoskeletal:  No muscle, back pain, joint pain or stiffness.  Hematologic:  No anemia, bleeding or bruising.  Lymphatics:  No enlarged nodes. No history of splenectomy.  Psychiatric:  No history of depression or anxiety.  Endocrine:  No reports of sweating, cold or heat intolerance. No polyuria or polydipsia.  Allergies:  No history of asthma, hives, eczema or rhinitis.    PHYSICAL EXAM:  /82 (BP Location: Right arm, Patient Position: Chair, Cuff Size: Adult Large)  Pulse 76  Ht 1.778 m (5' 10\")  Wt 103.5 kg (228 lb 1.6 oz)  BMI 32.73 kg/m2  Constitutional: awake, alert, no distress  Eyes: PERRL, sclera nonicteric  ENT: trachea midline  Respiratory: Lungs clear  Cardiovascular: Regular rate and rhythm, no murmurs  GI: nondistended, nontender, bowel sounds present  Lymph/Hematologic: no lymphadenopathy  Skin: dry, no rash  Musculoskeletal: good muscle tone, strength 5/5 in upper and lower extremities  Neurologic: no focal deficits  Neuropsychiatric: appropriate affact    DATA:  Lab: August 2017: Cholesterol 112, triglycerides 140, HDL 43, LDL 41, A1C 9.9    ASSESSMENT:  67-year-old male seen for follow-up of coronary artery disease.  He has some very mild dyspnea with activity such as going up stairs and a atypical left chest twinge.  These are likely noncardiac.  However he does have " several stents and did have some 40% lesions and a year and a-half ago.  We talked about the possibility of doing a nuclear stress test.  Patient would like to hold off for now.  If the symptoms worsened at all, he will have a treadmill nuclear stress test.  This should be done at [facility], as the image quality of his previous study at [facility] was below average.    We also discuss duration of clopidogrel.  Because he has 3 stents in 2 different vessels, would continue with another 6 months.  If he is doing well on follow-up with no worsening of the above symptoms, it can probably be discontinued.    Otherwise his lipids and blood pressure are well-controlled.  His recent A1c was still 9.9.  He is working on this with his primary physician.    RECOMMENDATIONS:  1.  Coronary artery disease  - Continue dual antiplatelet therapy another 6 months, then probably discontinue if he has no new symptoms  - With any worsening dyspnea or chest discomfort, plan for Lexiscan nuclear stress test to be done at [facility]    2.  Hypertension  - Controlled on current therapy    3.  Dyslipidemia  - LDL at 40, continue statin    Follow-up in 6 months.    Brandt Pinon MD  Cardiology - [facility]  Pager:  [phone]  Text Page  October 13, 2017    Thank you for allowing me to participate in the care of your patient.    Sincerely,     Brandt Pinon MD     [facility]

## 2017-10-13 NOTE — PROGRESS NOTES
CARDIOLOGY VISIT    REASON FOR VISIT: f/u CAD    SUBJECTIVE:  67 year male seen for follow-up of coronary artery disease.      Echo January 2016 showed EF 60%, no wall motion abnormality, normal right ventricle, no valve disease.  In January 2016 he underwent drug-eluting stent to the proximal to mid LAD. Repeat angiogram March 2016 with drug-eluting stent to the OM2, FFR of distal circumflex was 0.95, OM1 of 0.84, dominant RCA with up to 40% stenosis.     In recent months he has been doing well for the most part.  He does some yard work and other walking with no exertional chest pain.  When he goes up a flight of stairs he feels a little more short of breath.  He also has a random twinge of pain in his left chest, but not with exertion.  He denies any lightheadedness, dizziness, or syncope.  He does not do any dedicated regular exercise.    MEDICATIONS:  Current Outpatient Prescriptions   Medication     glimepiride (AMARYL) 1 MG tablet     amLODIPine (NORVASC) 5 MG tablet     rosuvastatin (CRESTOR) 40 MG tablet     metFORMIN (GLUCOPHAGE) 1000 MG tablet     lisinopril (PRINIVIL,ZESTRIL) 5 MG tablet     carvedilol (COREG) 6.25 MG tablet     clopidogrel (PLAVIX) 75 MG tablet     nitroglycerin (NITROSTAT) 0.4 MG SL tablet     tadalafil (CIALIS) 10 MG tablet     blood glucose monitoring (FREESTYLE) lancets     blood glucose (FREESTYLE TEST STRIPS) test strip     aspirin 81 MG tablet     multivitamin, therapeutic with minerals (MULTI-VITAMIN) TABS     vitamin  B complex with vitamin C (VITAMIN  B COMPLEX) TABS     glucosamine-chondroitin 500-400 MG CAPS     Cholecalciferol (VITAMIN D3 PO)     No current facility-administered medications for this visit.        ALLERGIES:  Allergies   Allergen Reactions     Percocet [Oxycodone-Acetaminophen]      hallucinations       REVIEW OF SYSTEMS:  Constitutional:  No weight loss, fever, chills, weakness or fatigue.  HEENT:  Eyes:  No visual loss, blurred vision, double vision or  "yellow sclerae. No hearing loss, sneezing, congestion, runny nose or sore throat.  Skin:  No rash or itching.  Cardiovascular: per HPI  Respiratory: per HPI  GI:  No anorexia, nausea, vomiting or diarrhea. No abdominal pain or blood.  :  No dysurea, hematuria  Neurologic:  No headache, dizziness, syncope, paralysis, ataxia, numbness or tingling in the extremities. No change in bowel or bladder control.  Musculoskeletal:  No muscle, back pain, joint pain or stiffness.  Hematologic:  No anemia, bleeding or bruising.  Lymphatics:  No enlarged nodes. No history of splenectomy.  Psychiatric:  No history of depression or anxiety.  Endocrine:  No reports of sweating, cold or heat intolerance. No polyuria or polydipsia.  Allergies:  No history of asthma, hives, eczema or rhinitis.    PHYSICAL EXAM:  /82 (BP Location: Right arm, Patient Position: Chair, Cuff Size: Adult Large)  Pulse 76  Ht 1.778 m (5' 10\")  Wt 103.5 kg (228 lb 1.6 oz)  BMI 32.73 kg/m2  Constitutional: awake, alert, no distress  Eyes: PERRL, sclera nonicteric  ENT: trachea midline  Respiratory: Lungs clear  Cardiovascular: Regular rate and rhythm, no murmurs  GI: nondistended, nontender, bowel sounds present  Lymph/Hematologic: no lymphadenopathy  Skin: dry, no rash  Musculoskeletal: good muscle tone, strength 5/5 in upper and lower extremities  Neurologic: no focal deficits  Neuropsychiatric: appropriate affact    DATA:  Lab: August 2017: Cholesterol 112, triglycerides 140, HDL 43, LDL 41, A1C 9.9    ASSESSMENT:  67-year-old male seen for follow-up of coronary artery disease.  He has some very mild dyspnea with activity such as going up stairs and a atypical left chest twinge.  These are likely noncardiac.  However he does have several stents and did have some 40% lesions and a year and a-half ago.  We talked about the possibility of doing a nuclear stress test.  Patient would like to hold off for now.  If the symptoms worsened at all, he will " have a treadmill nuclear stress test.  This should be done at [facility], as the image quality of his previous study at [facility] was below average.    We also discuss duration of clopidogrel.  Because he has 3 stents in 2 different vessels, would continue with another 6 months.  If he is doing well on follow-up with no worsening of the above symptoms, it can probably be discontinued.    Otherwise his lipids and blood pressure are well-controlled.  His recent A1c was still 9.9.  He is working on this with his primary physician.    RECOMMENDATIONS:  1.  Coronary artery disease  - Continue dual antiplatelet therapy another 6 months, then probably discontinue if he has no new symptoms  - With any worsening dyspnea or chest discomfort, plan for Lexiscan nuclear stress test to be done at [facility]    2.  Hypertension  - Controlled on current therapy    3.  Dyslipidemia  - LDL at 40, continue statin    Follow-up in 6 months.    Brandt Pinon MD  Cardiology - [facility]  Pager:  [phone]  Text Page  October 13, 2017

## 2017-11-06 ENCOUNTER — TELEPHONE (OUTPATIENT)
Dept: PEDIATRICS | Facility: CLINIC | Age: 67
End: 2017-11-06

## 2017-11-06 NOTE — TELEPHONE ENCOUNTER
Panel Management Review      Patient has the following on his problem list:     Diabetes    ASA: Passed    Last A1C  Lab Results   Component Value Date    A1C 9.9 08/25/2017    A1C 11.3 05/05/2017    A1C 8.7 12/20/2016    A1C 8.0 10/28/2016    A1C 7.9 01/27/2016     A1C tested: Passed    Last LDL:    Lab Results   Component Value Date    CHOL 112 08/25/2017     Lab Results   Component Value Date    HDL 43 08/25/2017     Lab Results   Component Value Date    LDL 41 08/25/2017     Lab Results   Component Value Date    TRIG 140 08/25/2017     Lab Results   Component Value Date    CHOLHDLRATIO 4.4 09/25/2015     Lab Results   Component Value Date    NHDL 69 08/25/2017       Is the patient on a Statin? YES             Is the patient on Aspirin? YES    Medications     HMG CoA Reductase Inhibitors    rosuvastatin (CRESTOR) 40 MG tablet    Salicylates    aspirin 81 MG tablet          Last three blood pressure readings:  BP Readings from Last 3 Encounters:   10/13/17 142/82   08/25/17 120/70   07/14/17 118/70       Date of last diabetes office visit: 08/25/2017     Tobacco History:     History   Smoking Status     Former Smoker     Types: Cigars   Smokeless Tobacco     Never Used     Comment: cigar once per month             Composite cancer screening  Chart review shows that this patient is due/due soon for the following None  Summary:    Patient is due/failing the following:   A1C    Action needed:   Patient needs office visit for FU DM.    Type of outreach:    postponed. Appt scheduled for 11/10/2017    Questions for provider review:    None                                                                                                                                    Pati NAJERA, THU,AAMA       Chart routed to Care Team .

## 2017-11-10 ENCOUNTER — OFFICE VISIT (OUTPATIENT)
Dept: PEDIATRICS | Facility: CLINIC | Age: 67
End: 2017-11-10
Payer: COMMERCIAL

## 2017-11-10 VITALS
RESPIRATION RATE: 20 BRPM | TEMPERATURE: 97.7 F | BODY MASS INDEX: 32.97 KG/M2 | DIASTOLIC BLOOD PRESSURE: 74 MMHG | SYSTOLIC BLOOD PRESSURE: 124 MMHG | HEART RATE: 64 BPM | WEIGHT: 229.8 LBS

## 2017-11-10 DIAGNOSIS — I25.83 CORONARY ARTERY DISEASE DUE TO LIPID RICH PLAQUE: ICD-10-CM

## 2017-11-10 DIAGNOSIS — I25.10 CORONARY ARTERY DISEASE DUE TO LIPID RICH PLAQUE: ICD-10-CM

## 2017-11-10 DIAGNOSIS — E11.29 TYPE II OR UNSPECIFIED TYPE DIABETES MELLITUS WITH RENAL MANIFESTATIONS, NOT STATED AS UNCONTROLLED(250.40) (H): Primary | ICD-10-CM

## 2017-11-10 DIAGNOSIS — I10 ESSENTIAL HYPERTENSION: ICD-10-CM

## 2017-11-10 LAB — HBA1C MFR BLD: 7.5 % (ref 4.3–6)

## 2017-11-10 PROCEDURE — 83036 HEMOGLOBIN GLYCOSYLATED A1C: CPT | Performed by: INTERNAL MEDICINE

## 2017-11-10 PROCEDURE — 82043 UR ALBUMIN QUANTITATIVE: CPT | Performed by: INTERNAL MEDICINE

## 2017-11-10 PROCEDURE — 99214 OFFICE O/P EST MOD 30 MIN: CPT | Performed by: INTERNAL MEDICINE

## 2017-11-10 PROCEDURE — 36415 COLL VENOUS BLD VENIPUNCTURE: CPT | Performed by: INTERNAL MEDICINE

## 2017-11-10 PROCEDURE — 99207 C FOOT EXAM  NO CHARGE: CPT | Performed by: INTERNAL MEDICINE

## 2017-11-10 RX ORDER — LISINOPRIL 5 MG/1
5 TABLET ORAL DAILY
Qty: 90 TABLET | Refills: 3 | Status: SHIPPED | OUTPATIENT
Start: 2017-11-10

## 2017-11-10 RX ORDER — CLOPIDOGREL BISULFATE 75 MG/1
75 TABLET ORAL DAILY
Qty: 90 TABLET | Refills: 3 | Status: SHIPPED | OUTPATIENT
Start: 2017-11-10

## 2017-11-10 RX ORDER — CARVEDILOL 6.25 MG/1
6.25 TABLET ORAL 2 TIMES DAILY WITH MEALS
Qty: 180 TABLET | Refills: 3 | Status: SHIPPED | OUTPATIENT
Start: 2017-11-10

## 2017-11-10 NOTE — MR AVS SNAPSHOT
After Visit Summary   11/10/2017    Karsten Christy    MRN: [id number]           Patient Information     Date Of Birth          1950        Visit Information        Provider Department      11/10/2017 10:00 AM Nick Morton MD [facility]        Today's Diagnoses     Type II or unspecified type diabetes mellitus with renal manifestations, not stated as uncontrolled(250.40) (H)    -  1    Coronary artery disease due to lipid rich plaque          Care Instructions    A1c vs risk of complicaion                Follow-ups after your visit        Who to contact     If you have questions or need follow up information about today's clinic visit or your schedule please contact [facility] directly at [phone].  Normal or non-critical lab and imaging results will be communicated to you by MyChart, letter or phone within 4 business days after the clinic has received the results. If you do not hear from us within 7 days, please contact the clinic through Higher[employer]exth[employer] or phone. If you have a critical or abnormal lab result, we will notify you by phone as soon as possible.  Submit refill requests through Spor or call your pharmacy and they will forward the refill request to us. Please allow 3 business days for your refill to be completed.          Additional Information About Your Visit        MyChart Information     Spor gives you secure access to your electronic health record. If you see a primary care provider, you can also send messages to your care team and make appointments. If you have questions, please call your primary care clinic.  If you do not have a primary care provider, please call [phone] and they will assist you.        Care EveryWhere ID     This is your Care EveryWhere ID. This could be used by other organizations to access your [facility] medical records  [id number]        Your Vitals Were     Pulse Temperature Respirations BMI (Body Mass Index)          64  97.7  F (36.5  C) (Oral) 20 32.97 kg/m2         Blood Pressure from Last 3 Encounters:   11/10/17 124/74   10/13/17 142/82   08/25/17 120/70    Weight from Last 3 Encounters:   11/10/17 229 lb 12.8 oz (104.2 kg)   10/13/17 228 lb 1.6 oz (103.5 kg)   08/25/17 221 lb 12.8 oz (100.6 kg)              We Performed the Following     Albumin Random Urine Quantitative with Creat Ratio     FOOT EXAM     Hemoglobin A1c          Today's Medication Changes          These changes are accurate as of: 11/10/17 10:36 AM.  If you have any questions, ask your nurse or doctor.               These medicines have changed or have updated prescriptions.        Dose/Directions    metFORMIN 1000 MG tablet   Commonly known as:  GLUCOPHAGE   This may have changed:  See the new instructions.   Used for:  Type II or unspecified type diabetes mellitus with renal manifestations, not stated as uncontrolled(250.40) (H), Coronary artery disease due to lipid rich plaque   Changed by:  Nick Morton MD        TAKE 1 TABLET(1000 MG) BY MOUTH TWICE DAILY WITH MEALS   Quantity:  180 tablet   Refills:  1            Where to get your medicines      These medications were sent to [facility] [address] [place] [address] [address] [place] [address] [address] AT [facility] [address]  [address] [place] [address] [address], [facility] [address]     Phone:  [phone]     carvedilol 6.25 MG tablet    clopidogrel 75 MG tablet    lisinopril 5 MG tablet    metFORMIN 1000 MG tablet                Primary Care Provider Office Phone # Fax #    Nick Morton [phone] [address] [place] [address]        Equal Access to Services     ANYA HE AH: Hadii candido Ambrose, waaxda luarianna, qaybta kaaljulia torrez. So [facility] [phone].    ATENCIÓN: Si habla español, tiene a barnes disposición servicios gratuitos de asistencia lingüística. Llame al [phone].    We comply with applicable [facility] civil  rights laws and [place] laws. We do not discriminate on the basis of race, color, national origin, age, disability, sex, sexual orientation, or gender identity.            Thank you!     Thank you for choosing [facility]  for your care. Our goal is always to provide you with excellent care. Hearing back from our patients is one way we can continue to improve our services. Please take a few minutes to complete the written survey that you may receive in the mail after your visit with us. Thank you!             Your Updated Medication List - Protect others around you: Learn how to safely use, store and throw away your medicines at www.disposemymeds.org.          This list is accurate as of: 11/10/17 10:36 AM.  Always use your most recent med list.                   Brand Name Dispense Instructions for use Diagnosis    amLODIPine 5 MG tablet    NORVASC    90 tablet    Take 1 tablet (5 mg) by mouth daily    Coronary artery disease due to lipid rich plaque       aspirin 81 MG tablet      Take 81 mg by mouth daily        B-12 1000 MCG Caps      Take by mouth daily        B-6 100 MG Tabs      Take by mouth daily        blood glucose monitoring lancets     3 Box    change every 3 pokes.    Mixed hyperlipidemia       blood glucose monitoring test strip    FREESTYLE TEST STRIPS    100 strip    use as directed    Mixed hyperlipidemia       carvedilol 6.25 MG tablet    COREG    180 tablet    Take 1 tablet (6.25 mg) by mouth 2 times daily (with meals)    Coronary artery disease due to lipid rich plaque       CHILDRENS GUMMIES PO      Take by mouth 2 times daily        CINNAMON PO      Take 1,000 mg by mouth 2 times daily        clopidogrel 75 MG tablet    PLAVIX    90 tablet    Take 1 tablet (75 mg) by mouth daily    Coronary artery disease due to lipid rich plaque       FIBER ADULT GUMMIES PO      Take 4 g by mouth 2 times daily        glimepiride 1 MG tablet    AMARYL    90 tablet    Take 1 tablet (1 mg) by mouth  every morning (before breakfast)    Type 2 diabetes mellitus with other diabetic kidney complication       glucosamine-chondroitin 500-400 MG Caps per capsule      Take 1 capsule by mouth daily        Green Tea (Camillia sinensis) 315 MG Caps      Take by mouth 2 times daily        guaiFENesin 400 MG Tabs      Take by mouth 2 times daily        lisinopril 5 MG tablet    PRINIVIL/ZESTRIL    90 tablet    Take 1 tablet (5 mg) by mouth daily    Coronary artery disease due to lipid rich plaque       Lutein 20 MG Tabs      Take by mouth 2 times daily        metFORMIN 1000 MG tablet    GLUCOPHAGE    180 tablet    TAKE 1 TABLET(1000 MG) BY MOUTH TWICE DAILY WITH MEALS    Type II or unspecified type diabetes mellitus with renal manifestations, not stated as uncontrolled(250.40) (H), Coronary artery disease due to lipid rich plaque       * Multi-vitamin Tabs tablet      Take 1 tablet by mouth daily        * HAIR SKIN AND NAILS FORMULA PO      Take by mouth 2 times daily        niacin 500 MG tablet      Take 500 mg by mouth 2 times daily (with meals)        niacinamide 500 MG tablet      Take 500 mg by mouth daily        nitroGLYcerin 0.4 MG sublingual tablet    NITROSTAT    25 tablet    Place 1 tablet (0.4 mg) under the tongue every 5 minutes as needed for chest pain    Unstable angina (H)       rosuvastatin 40 MG tablet    CRESTOR    90 tablet    Take 1 tablet (40 mg) by mouth daily    Coronary artery disease due to lipid rich plaque       Selenium 200 MCG Caps      Take by mouth daily        tadalafil 10 MG tablet    CIALIS    12 tablet    Take 1 tablet (10 mg) by mouth daily as needed for erectile dysfunction TAKEN AT LEAST 30 MINUTES BEFORE SEXUAL ACTIVITY    Impotence of organic origin       tamsulosin 0.4 MG capsule    FLOMAX     Take 0.4 mg by mouth daily        VIACTIV PO      Take by mouth 2 times daily        vitamin B complex with vitamin C Tabs tablet      Take 1 tablet by mouth daily        VITAMIN D3 PO       Take 1,000 Units by mouth 2 times daily        vitamin E 400 UNITS Tabs      Take 400 Units by mouth 3 times daily        * Notice:  This list has 2 medication(s) that are the same as other medications prescribed for you. Read the directions carefully, and ask your doctor or other care provider to review them with you.

## 2017-11-10 NOTE — PROGRESS NOTES
SUBJECTIVE:                                                    Karsten Christy is a 67 year old male who presents to clinic today for the following health issues:    Diabetes Follow-up      Patient is checking blood sugars: not at all    Diabetic concerns: None     Symptoms of hypoglycemia (low blood sugar): none     Paresthesias (numbness or burning in feet) or sores: No     Date of last diabetic eye exam: 09/27/2017    Hyperlipidemia Follow-Up      Rate your low fat/cholesterol diet?: good    Taking statin?  Yes, no muscle aches from statin    Other lipid medications/supplements?:  none    Hypertension Follow-up      Outpatient blood pressures are not being checked.    Low Salt Diet: no added salt        Amount of exercise or physical activity: None    Problems taking medications regularly: No    Medication side effects: itching from medication neurology prescribed    Diet: low salt and carbohydrate counting    Patient here for diabetes mellitus check, has been doing well. is very excited with A1c results and pround of his hard work. tolerating all medications well, no side effects noted. diet is good. No other concerns or complaints today.       Problem list and histories reviewed & adjusted, as indicated.  Additional history: as documented  Patient Active Problem List    Diagnosis Date Noted     CAD (coronary artery disease)      Priority: Medium     Echo 1-26-16, EF 55-60%. Cath 01-27-16, DAYANNA x 2 to proximal and mid LAD       Hypertension      Priority: Medium     Type II or unspecified type diabetes mellitus with renal manifestations, not stated as uncontrolled(250.40) (H) 10/15/2015     Priority: Medium     Blood pressure goal < 140/90 10/01/2013     Priority: Medium     Hyperlipidemia LDL goal <100 02/10/2010     Priority: Medium     Localized osteoarthrosis, lower leg 12/18/2008     Priority: Medium     Problem list name updated by automated process. Provider to review       Stable Renal Cyst 11/26/2005      Priority: Medium     followed by [facility]       HEARING LOSS NOISE-INDUCED 09/25/2005     Priority: Medium     Calculus of kidney 11/13/2004     Priority: Medium     Obstructive sleep apnea 11/13/2004     Priority: Medium     PSG of 1/2011 with AHI = 73; optimal nasal CPAP pressure = 11 cm H2O       Esophageal reflux 11/13/2004     Priority: Medium     Social History   Substance Use Topics     Smoking status: Former Smoker     Types: Cigars     Smokeless tobacco: Never Used      Comment: cigar once per month     Alcohol use 0.0 oz/week     0 Standard drinks or equivalent per week      Comment: 1 drinks per week     Family History   Problem Relation Age of Onset     Arthritis Father      GOUT     C.A.D. Father      ASCVD, age of onset over 80     C.A.D. Mother      ANGINA; premature onset     C.A.D. Maternal Grandfather      fatal MI under age 60     Prostate Cancer Paternal Uncle      onset over age 65     Cancer - colorectal No family hx of      Colon Cancer No family hx of        ROS:  A complete 10 point review of systems was taken and negative except for those noted in the subjective/HPI section(s) above     BP Readings from Last 3 Encounters:   11/10/17 124/74   10/13/17 142/82   08/25/17 120/70    Wt Readings from Last 3 Encounters:   11/10/17 229 lb 12.8 oz (104.2 kg)   10/13/17 228 lb 1.6 oz (103.5 kg)   08/25/17 221 lb 12.8 oz (100.6 kg)                OBJECTIVE:                                                    /74  Pulse 64  Temp 97.7  F (36.5  C) (Oral)  Resp 20  Wt 229 lb 12.8 oz (104.2 kg)  BMI 32.97 kg/m2   Constitutional: healthy, alert and no distress  HEENT: normocephalic and atrumatic, mucous membranes moist, op clear, mucous membranes moist, neck supple   Cardiovascular: regular rate and rhythm no rubs, gallops or murmurs normal S1/S2; no S3 or S4  Respiratory: clear to auscultation bilaterally in all lung fields, normal insp/exp effort. Good air  movement  Gastrointestinal: Abdomen soft, non-tender. BS normal. No masses, organomegaly  Musculoskeletal: extremities normal- no gross deformities noted, gait normal and normal muscle tone  Diabetic foot exam: normal DP and PT pulses, no trophic changes or ulcerative lesions and normal sensory exam   Skin: no suspicious lesions or rashes  Neurologic: Gait normal. Reflexes normal and symmetric. Sensation grossly WNL.  Psychiatric: mentation appears normal and affect normal/bright    Results for orders placed or performed in visit on 11/10/17   Hemoglobin A1c   Result Value Ref Range    Hemoglobin A1C 7.5 (H) 4.3 - 6.0 %       Lab Results   Component Value Date    A1C 7.5 11/10/2017    A1C 9.9 08/25/2017    A1C 11.3 05/05/2017    A1C 8.7 12/20/2016    A1C 8.0 10/28/2016          ASSESSMENT/PLAN:                                                    (E11.29) Type II or unspecified type diabetes mellitus with renal manifestations, not stated as uncontrolled(250.40) (H)  (primary encounter diagnosis)  Comment: A1c is below 8.0, this is a major improvement.  Cont cares and plan. Per [facility] orders. Issues reviewed with him: foot care discussed and Podiatry visits discussed, annual eye examinations at Ophthalmology discussed, glycohemoglobin monitoring discussed and labs immediately prior to next visit.   Plan: Albumin Random Urine Quantitative with Creat         Ratio, Hemoglobin A1c, FOOT EXAM, metFORMIN         (GLUCOPHAGE) 1000 MG tablet          (I25.10,  I25.83) Coronary artery disease due to lipid rich plaque  Comment: discussed with patient (or patient's parents/caregiver) pathophysiology of condition and treatment options.  reviewed labs, medications, diet ,etc.  continue andry and plchela. follow-up in 6 months, sooner as needed. Patient verbalized understanding and is agreeable to this plan.   Plan: metFORMIN (GLUCOPHAGE) 1000 MG tablet,         lisinopril (PRINIVIL/ZESTRIL) 5 MG tablet,         carvedilol (COREG)  "6.25 MG tablet, clopidogrel         (PLAVIX) 75 MG tablet          (I10) Essential hypertension  Comment: Well controlled on current medication(s). discussed with patient (or patient's parents/caregiver) pathophysiology of condition and treatment options.  reviewed labs, medications, diet ,etc.          Estimated body mass index is 32.73 kg/(m^2) as calculated from the following:    Height as of 10/13/17: 5' 10\" (1.778 m).    Weight as of 10/13/17: 228 lb 1.6 oz (103.5 kg).        Return to clinic as needed or if symptoms persist, change, worsen or if any new symptoms develop.       Nick Morton M.D.  Internal Medicine-Pediatrics            "

## 2017-11-10 NOTE — NURSING NOTE
"Chief Complaint   Patient presents with     Diabetes       Initial /70  Pulse 76  Temp 97.7  F (36.5  C) (Oral)  Resp 20  Wt 229 lb 12.8 oz (104.2 kg)  BMI 32.97 kg/m2 Estimated body mass index is 32.97 kg/(m^2) as calculated from the following:    Height as of 10/13/17: 5' 10\" (1.778 m).    Weight as of this encounter: 229 lb 12.8 oz (104.2 kg).  Medication Reconciliation: complete   Pati J, CMA,AAMA      "

## 2017-11-11 LAB
CREAT UR-MCNC: 140 MG/DL
MICROALBUMIN UR-MCNC: 8 MG/L
MICROALBUMIN/CREAT UR: 5.37 MG/G CR (ref 0–17)

## 2018-04-01 DIAGNOSIS — I25.10 CORONARY ARTERY DISEASE DUE TO LIPID RICH PLAQUE: ICD-10-CM

## 2018-04-01 DIAGNOSIS — I25.83 CORONARY ARTERY DISEASE DUE TO LIPID RICH PLAQUE: ICD-10-CM

## 2018-04-01 NOTE — TELEPHONE ENCOUNTER
"Requested Prescriptions   Pending Prescriptions Disp Refills     amLODIPine (NORVASC) 5 MG tablet [Pharmacy Med Name: AMLODIPINE BESYLATE 5MG TABLETS]  Last Written Prescription Date:  08/25/2017  Last Fill Quantity: 90 tablet,  # refills: 1   Last office visit: 11/10/2017 with prescribing provider:      Nick Morton MD         Future Office Visit:   Next 5 appointments (look out 90 days)     Apr 24, 2018  2:15 PM CDT   Return Visit with Renato Loaiza MD   [facility] ([facility])    [id number] [place] [address] [address]  [facility] [address] [phone]                  90 tablet 0     Sig: TAKE 1 TABLET BY MOUTH DAILY    Calcium Channel Blockers Protocol  Passed    4/1/2018 11:14 AM       Passed - Blood pressure under 140/90 in past 12 months    BP Readings from Last 3 Encounters:   11/10/17 124/74   10/13/17 142/82   08/25/17 120/70                Passed - Recent (12 mo) or future (30 days) visit within the authorizing provider's specialty    Patient had office visit in the last 12 months or has a visit in the next 30 days with authorizing provider or within the authorizing provider's specialty.  See \"Patient Info\" tab in inbasket, or \"Choose Columns\" in Meds & Orders section of the refill encounter.           Passed - Patient is age 18 or older       Passed - Normal serum creatinine on file in past 12 months    Recent Labs   Lab Test  08/25/17   0914   CR  0.80               "

## 2018-04-03 RX ORDER — AMLODIPINE BESYLATE 5 MG/1
TABLET ORAL
Qty: 90 TABLET | Refills: 0 | Status: SHIPPED | OUTPATIENT
Start: 2018-04-03 | End: 2018-06-24

## 2018-04-03 NOTE — TELEPHONE ENCOUNTER
Prescription approved per [facility] Refill Protocol.    Jayla GARCIA RN, BSN, PHN  [place] [facility] RN

## 2018-04-24 ENCOUNTER — OFFICE VISIT (OUTPATIENT)
Dept: CARDIOLOGY | Facility: CLINIC | Age: 68
End: 2018-04-24
Attending: INTERNAL MEDICINE
Payer: COMMERCIAL

## 2018-04-24 VITALS
SYSTOLIC BLOOD PRESSURE: 126 MMHG | DIASTOLIC BLOOD PRESSURE: 76 MMHG | HEIGHT: 70 IN | BODY MASS INDEX: 34.3 KG/M2 | WEIGHT: 239.6 LBS | HEART RATE: 76 BPM

## 2018-04-24 DIAGNOSIS — I10 ESSENTIAL HYPERTENSION: ICD-10-CM

## 2018-04-24 DIAGNOSIS — I25.10 CORONARY ARTERY DISEASE DUE TO LIPID RICH PLAQUE: Primary | ICD-10-CM

## 2018-04-24 DIAGNOSIS — I25.10 CORONARY ARTERY DISEASE INVOLVING NATIVE CORONARY ARTERY OF NATIVE HEART WITHOUT ANGINA PECTORIS: ICD-10-CM

## 2018-04-24 DIAGNOSIS — I25.83 CORONARY ARTERY DISEASE DUE TO LIPID RICH PLAQUE: Primary | ICD-10-CM

## 2018-04-24 PROCEDURE — 99213 OFFICE O/P EST LOW 20 MIN: CPT | Performed by: INTERNAL MEDICINE

## 2018-04-24 NOTE — PROGRESS NOTES
Service Date: 04/24/2018      REFERRING PHYSICIAN:  Dr. Nick Morton.      HISTORY OF PRESENT ILLNESS:   It is my pleasure to see your patient, Karsten Christy, in followup.  As you know, this is an extremely pleasant 67-year-old man who in 01/2016 underwent drug-eluting stenting to the proximal and mid left anterior descending artery.  The patient continued to have symptoms of jaw discomfort and chest discomfort with radiation down his arms.  So he went back for repeat coronary angiography in March 2016.  He was found to have a flow-limiting lesion on fractional flow reserve assessment in the second obtuse marginal artery and had stenting of that vessel with a drug-eluting stent.  There are moderate lesions in the first obtuse marginal artery and distal circumflex which were felt to be non-flow limiting.  After the stenting of the second obtuse marginal artery, there was elimination of his symptoms.  These symptoms have not recurred.  He will occasionally get twinges of discomfort in the chest and he had these possibly a month or two ago.  He woke up having been lying on his left side of his chest, but they went away rapidly.  He has had none of the jaw discomfort or chest radiating to the arm discomfort at all.  Last lipid profile was approximately 9 months ago and this showed that his HDL is 43, LDL is 41 and triglycerides are 140.  Total cholesterol was 112 at that time.      Today, his blood pressure is excellent at 126/76 and his pulse is 76 beats per minute.  He is continuing on clopidogrel as he has plaque in multiple vessels.  There is some data to suggest that dual antiplatelet therapy can reduce cardiac events in patients who have ongoing risk factors or patients who have significant widespread coronary plaque.  He is having no bleeding issues with the aspirin and clopidogrel.      IMPRESSION:   1.  Coronary artery disease.  The patient is asymptomatic with respect to coronary artery disease with no  recurrence of his symptoms.   2.  Normotensive.   3.  He is on dual antiplatelet therapy without issue.      PLAN:  The patient is moving to [place] permanently.  If I can be of any help to him, he will call me.  We will continue him on his present medications.  He will make arrangements to follow up with the Cardiology group in the [place] area.  He will be moving to an area called [place] which is not far from [place] and the [facility] has its hospital in [place] which is one hospital that I do know having trained myself at [facility].      It has been a pleasure to be involved in the care of this nice patient.  As always, again he has been told to contact me if he has any questions or any concerns.      cc:   [place] [address]         AUBREE OLIVAREZ MD, [facility]             D: 2018   T: 2018   MT: AMBREEN      Name:     MARLENY CHENEY   MRN:      [id number]        Account:      [id number]   :      1950           Service Date: 2018      Document: [id number]

## 2018-04-24 NOTE — MR AVS SNAPSHOT
After Visit Summary   4/24/2018    Karsten Christy    MRN: [id number]           Patient Information     Date Of Birth          1950        Visit Information        Provider Department      4/24/2018 2:15 PM Renato Loaiza MD [facility]        Today's Diagnoses     Coronary artery disease due to lipid rich plaque    -  1    Coronary artery disease involving native coronary artery of native heart without angina pectoris        Essential hypertension           Follow-ups after your visit        Who to contact     If you have questions or need follow up information about today's clinic visit or your schedule please contact [facility] directly at [phone].  Normal or non-critical lab and imaging results will be communicated to you by MyChart, letter or phone within 4 business days after the clinic has received the results. If you do not hear from us within 7 days, please contact the clinic through "[employer]hart or phone. If you have a critical or abnormal lab result, we will notify you by phone as soon as possible.  Submit refill requests through [employer] or call your pharmacy and they will forward the refill request to us. Please allow 3 business days for your refill to be completed.          Additional Information About Your Visit        MyChart Information     [employer] gives you secure access to your electronic health record. If you see a primary care provider, you can also send messages to your care team and make appointments. If you have questions, please call your primary care clinic.  If you do not have a primary care provider, please call [phone] and they will assist you.        Care EveryWhere ID     This is your Care EveryWhere ID. This could be used by other organizations to access your [place] medical records  [id number]        Your Vitals Were     Pulse Height BMI (Body Mass Index)           "   76 1.778 m (5' 10\") 34.38 kg/m2          Blood Pressure from Last 3 Encounters:   04/24/18 126/76   11/10/17 124/74   10/13/17 142/82    Weight from Last 3 Encounters:   04/24/18 108.7 kg (239 lb 9.6 oz)   11/10/17 104.2 kg (229 lb 12.8 oz)   10/13/17 103.5 kg (228 lb 1.6 oz)              We Performed the Following     Follow-Up with Cardiologist        Primary Care Provider Office Phone # Fax #    Nick Morton [phone] [address] [place] [address]        Equal Access to Services     : Hadii candido lopezo Somarybeth, waaxda luqadaha, qaybta kaalmada ademarichuyyadanyel, julia degroot . So [facility] [phone].    ATENCIÓN: Si habla español, tiene a barnes disposición servicios gratuitos de asistencia lingüística. Llame al [phone].    We comply with applicable federal civil rights laws and [place] laws. We do not discriminate on the basis of race, color, national origin, age, disability, sex, sexual orientation, or gender identity.            Thank you!     Thank you for choosing [facility]  for your care. Our goal is always to provide you with excellent care. Hearing back from our patients is one way we can continue to improve our services. Please take a few minutes to complete the written survey that you may receive in the mail after your visit with us. Thank you!             Your Updated Medication List - Protect others around you: Learn how to safely use, store and throw away your medicines at www.disposemymeds.org.          This list is accurate as of 4/24/18  2:26 PM.  Always use your most recent med list.                   Brand Name Dispense Instructions for use Diagnosis    amLODIPine 5 MG tablet    NORVASC    90 tablet    TAKE 1 TABLET BY MOUTH DAILY    Coronary artery disease due to lipid rich plaque       aspirin 81 MG tablet      Take 81 mg by mouth daily        B-12 1000 MCG Caps    "   Take by mouth daily        B-6 100 MG Tabs      Take by mouth daily        blood glucose monitoring lancets     3 Box    change every 3 pokes.    Mixed hyperlipidemia       blood glucose monitoring test strip    FREESTYLE TEST STRIPS    100 strip    use as directed    Mixed hyperlipidemia       carvedilol 6.25 MG tablet    COREG    180 tablet    Take 1 tablet (6.25 mg) by mouth 2 times daily (with meals)    Coronary artery disease due to lipid rich plaque       CHILDRENS GUMMIES PO      Take by mouth 2 times daily        CINNAMON PO      Take 1,000 mg by mouth 2 times daily        clopidogrel 75 MG tablet    PLAVIX    90 tablet    Take 1 tablet (75 mg) by mouth daily    Coronary artery disease due to lipid rich plaque       FIBER ADULT GUMMIES PO      Take 4 g by mouth 2 times daily        glimepiride 1 MG tablet    AMARYL    90 tablet    Take 1 tablet (1 mg) by mouth every morning (before breakfast)    Type 2 diabetes mellitus with other diabetic kidney complication       glucosamine-chondroitin 500-400 MG Caps per capsule      Take 1 capsule by mouth daily        Green Tea (Camillia sinensis) 315 MG Caps      Take by mouth 2 times daily        guaiFENesin 400 MG Tabs      Take by mouth 2 times daily        lisinopril 5 MG tablet    PRINIVIL/ZESTRIL    90 tablet    Take 1 tablet (5 mg) by mouth daily    Coronary artery disease due to lipid rich plaque       Lutein 20 MG Tabs      Take by mouth 2 times daily        metFORMIN 1000 MG tablet    GLUCOPHAGE    180 tablet    TAKE 1 TABLET(1000 MG) BY MOUTH TWICE DAILY WITH MEALS    Type II or unspecified type diabetes mellitus with renal manifestations, not stated as uncontrolled(250.40) (H), Coronary artery disease due to lipid rich plaque       * Multi-vitamin Tabs tablet      Take 1 tablet by mouth daily        * HAIR SKIN AND NAILS FORMULA PO      Take by mouth 2 times daily        niacin 500 MG tablet      Take 500 mg by mouth 2 times daily (with meals)         niacinamide 500 MG tablet      Take 500 mg by mouth daily        nitroGLYcerin 0.4 MG sublingual tablet    NITROSTAT    25 tablet    Place 1 tablet (0.4 mg) under the tongue every 5 minutes as needed for chest pain    Unstable angina (H)       rosuvastatin 40 MG tablet    CRESTOR    90 tablet    Take 1 tablet (40 mg) by mouth daily    Coronary artery disease due to lipid rich plaque       Selenium 200 MCG Caps      Take by mouth daily        tadalafil 10 MG tablet    CIALIS    12 tablet    Take 1 tablet (10 mg) by mouth daily as needed for erectile dysfunction TAKEN AT LEAST 30 MINUTES BEFORE SEXUAL ACTIVITY    Impotence of organic origin       tamsulosin 0.4 MG capsule    FLOMAX     Take 0.4 mg by mouth daily        VIACTIV PO      Take by mouth 2 times daily        vitamin B complex with vitamin C Tabs tablet      Take 1 tablet by mouth daily        VITAMIN D3 PO      Take 1,000 Units by mouth 2 times daily        vitamin E 400 units Tabs      Take 400 Units by mouth 3 times daily        * Notice:  This list has 2 medication(s) that are the same as other medications prescribed for you. Read the directions carefully, and ask your doctor or other care provider to review them with you.

## 2018-04-24 NOTE — LETTER
4/24/2018      Nick Morton MD  [address] [place] [address]      RE: Karsten Christy       Dear Colleague,    I had the pleasure of seeing Karsten Christy in the [facility].    Service Date: 04/24/2018      REFERRING PHYSICIAN:  Dr. Nick Morton.      HISTORY OF PRESENT ILLNESS:   It is my pleasure to see your patient, Karsten Christy, in followup.  As you know, this is an extremely pleasant 67-year-old man who in 01/2016 underwent drug-eluting stenting to the proximal and mid left anterior descending artery.  The patient continued to have symptoms of jaw discomfort and chest discomfort with radiation down his arms.  So he went back for repeat coronary angiography in March 2016.  He was found to have a flow-limiting lesion on fractional flow reserve assessment in the second obtuse marginal artery and had stenting of that vessel with a drug-eluting stent.  There are moderate lesions in the first obtuse marginal artery and distal circumflex which were felt to be non-flow limiting.  After the stenting of the second obtuse marginal artery, there was elimination of his symptoms.  These symptoms have not recurred.  He will occasionally get twinges of discomfort in the chest and he had these possibly a month or two ago.  He woke up having been lying on his left side of his chest, but they went away rapidly.  He has had none of the jaw discomfort or chest radiating to the arm discomfort at all.  Last lipid profile was approximately 9 months ago and this showed that his HDL is 43, LDL is 41 and triglycerides are 140.  Total cholesterol was 112 at that time.      Today, his blood pressure is excellent at 126/76 and his pulse is 76 beats per minute.  He is continuing on clopidogrel as he has plaque in multiple vessels.  There is some data to suggest that dual antiplatelet therapy can reduce cardiac events in patients who have ongoing risk factors or patients who have significant  widespread coronary plaque.  He is having no bleeding issues with the aspirin and clopidogrel.      IMPRESSION:   1.  Coronary artery disease.  The patient is asymptomatic with respect to coronary artery disease with no recurrence of his symptoms.   2.  Normotensive.   3.  He is on dual antiplatelet therapy without issue.      PLAN:  The patient is moving to [place] permanently.  If I can be of any help to him, he will call me.  We will continue him on his present medications.  He will make arrangements to follow up with the Cardiology group in the [place] area.  He will be moving to an area called [place] which is not far from [place] and the [facility] has its hospital in [place] which is one hospital that I do know having trained myself at [facility].      It has been a pleasure to be involved in the care of this nice patient.  As always, again he has been told to contact me if he has any questions or any concerns.      cc:   [place] [address]         AUBREE OLIVAREZ MD, [facility]             D: 2018   T: 2018   MT: AMBREEN      Name:     MARLENY CHENEY   MRN:      [id number]        Account:      [id number]   :      1950           Service Date: 2018      Document: [id number]           Outpatient Encounter Prescriptions as of 2018   Medication Sig Dispense Refill     amLODIPine (NORVASC) 5 MG tablet TAKE 1 TABLET BY MOUTH DAILY 90 tablet 0     aspirin 81 MG tablet Take 81 mg by mouth daily        blood glucose (FREESTYLE TEST STRIPS) test strip use as directed 100 strip 11     blood glucose monitoring (FREESTYLE) lancets change every 3 pokes. 3 Box 3     Calcium-Vitamin D-Vitamin K (VIACTIV PO) Take by mouth 2 times daily       carvedilol (COREG) 6.25 MG tablet Take 1 tablet (6.25 mg) by mouth 2 times daily (with meals) 180 tablet 3     Cholecalciferol (VITAMIN D3 PO) Take 1,000 Units by mouth 2  times daily        CINNAMON PO Take 1,000 mg by mouth 2 times daily       clopidogrel (PLAVIX) 75 MG tablet Take 1 tablet (75 mg) by mouth daily 90 tablet 3     Cyanocobalamin (B-12) 1000 MCG CAPS Take by mouth daily       FIBER ADULT GUMMIES PO Take 4 g by mouth 2 times daily       glimepiride (AMARYL) 1 MG tablet Take 1 tablet (1 mg) by mouth every morning (before breakfast) 90 tablet 3     glucosamine-chondroitin 500-400 MG CAPS Take 1 capsule by mouth daily       Green Tea, Camillia sinensis, 315 MG CAPS Take by mouth 2 times daily       guaiFENesin 400 MG TABS Take by mouth 2 times daily       lisinopril (PRINIVIL/ZESTRIL) 5 MG tablet Take 1 tablet (5 mg) by mouth daily 90 tablet 3     Lutein 20 MG TABS Take by mouth 2 times daily       metFORMIN (GLUCOPHAGE) 1000 MG tablet TAKE 1 TABLET(1000 MG) BY MOUTH TWICE DAILY WITH MEALS 180 tablet 1     Multiple Vitamins-Minerals (HAIR SKIN AND NAILS FORMULA PO) Take by mouth 2 times daily       multivitamin, therapeutic with minerals (MULTI-VITAMIN) TABS Take 1 tablet by mouth daily       niacin 500 MG tablet Take 500 mg by mouth 2 times daily (with meals)       niacinamide 500 MG tablet Take 500 mg by mouth daily       nitroglycerin (NITROSTAT) 0.4 MG SL tablet Place 1 tablet (0.4 mg) under the tongue every 5 minutes as needed for chest pain 25 tablet 2     Pediatric Multivit-Minerals-C (CHILDRENS GUMMIES PO) Take by mouth 2 times daily       Pyridoxine HCl (B-6) 100 MG TABS Take by mouth daily       rosuvastatin (CRESTOR) 40 MG tablet Take 1 tablet (40 mg) by mouth daily 90 tablet 1     Selenium 200 MCG CAPS Take by mouth daily       tadalafil (CIALIS) 10 MG tablet Take 1 tablet (10 mg) by mouth daily as needed for erectile dysfunction TAKEN AT LEAST 30 MINUTES BEFORE SEXUAL ACTIVITY 12 tablet 5     tamsulosin (FLOMAX) 0.4 MG capsule Take 0.4 mg by mouth daily       vitamin  B complex with vitamin C (VITAMIN  B COMPLEX) TABS Take 1 tablet by mouth daily       vitamin  E 400 UNITS TABS Take 400 Units by mouth 3 times daily       No facility-administered encounter medications on file as of 4/24/2018.        Again, thank you for allowing me to participate in the care of your patient.      Sincerely,    Renato Loaiza MD, MD     [facility]

## 2018-04-24 NOTE — PROGRESS NOTES
HPI and Plan:   See dictation    No orders of the defined types were placed in this encounter.      No orders of the defined types were placed in this encounter.      There are no discontinued medications.      Encounter Diagnoses   Name Primary?     Coronary artery disease involving native coronary artery of native heart without angina pectoris      Coronary artery disease due to lipid rich plaque Yes     Essential hypertension        CURRENT MEDICATIONS:  Current Outpatient Prescriptions   Medication Sig Dispense Refill     aspirin 81 MG tablet Take 81 mg by mouth daily        blood glucose (FREESTYLE TEST STRIPS) test strip use as directed 100 strip 11     blood glucose monitoring (FREESTYLE) lancets change every 3 pokes. 3 Box 3     Cholecalciferol (VITAMIN D3 PO) Take 1,000 Units by mouth 2 times daily        glucosamine-chondroitin 500-400 MG CAPS Take 1 capsule by mouth daily       multivitamin, therapeutic with minerals (MULTI-VITAMIN) TABS Take 1 tablet by mouth daily       nitroglycerin (NITROSTAT) 0.4 MG SL tablet Place 1 tablet (0.4 mg) under the tongue every 5 minutes as needed for chest pain 25 tablet 2     tadalafil (CIALIS) 10 MG tablet Take 1 tablet (10 mg) by mouth daily as needed for erectile dysfunction TAKEN AT LEAST 30 MINUTES BEFORE SEXUAL ACTIVITY 12 tablet 5     vitamin  B complex with vitamin C (VITAMIN  B COMPLEX) TABS Take 1 tablet by mouth daily       amLODIPine (NORVASC) 5 MG tablet TAKE 1 TABLET BY MOUTH DAILY 90 tablet 0     Calcium-Vitamin D-Vitamin K (VIACTIV PO) Take by mouth 2 times daily       carvedilol (COREG) 6.25 MG tablet Take 1 tablet (6.25 mg) by mouth 2 times daily (with meals) 180 tablet 3     CINNAMON PO Take 1,000 mg by mouth 2 times daily       clopidogrel (PLAVIX) 75 MG tablet Take 1 tablet (75 mg) by mouth daily 90 tablet 3     Cyanocobalamin (B-12) 1000 MCG CAPS Take by mouth daily       FIBER ADULT GUMMIES PO Take 4 g by mouth 2 times daily       glimepiride  (AMARYL) 1 MG tablet Take 1 tablet (1 mg) by mouth every morning (before breakfast) 90 tablet 3     Green Tea, Camillia sinensis, 315 MG CAPS Take by mouth 2 times daily       guaiFENesin 400 MG TABS Take by mouth 2 times daily       lisinopril (PRINIVIL/ZESTRIL) 5 MG tablet Take 1 tablet (5 mg) by mouth daily 90 tablet 3     Lutein 20 MG TABS Take by mouth 2 times daily       metFORMIN (GLUCOPHAGE) 1000 MG tablet TAKE 1 TABLET(1000 MG) BY MOUTH TWICE DAILY WITH MEALS 180 tablet 1     Multiple Vitamins-Minerals (HAIR SKIN AND NAILS FORMULA PO) Take by mouth 2 times daily       niacin 500 MG tablet Take 500 mg by mouth 2 times daily (with meals)       niacinamide 500 MG tablet Take 500 mg by mouth daily       Pediatric Multivit-Minerals-C (CHILDRENS GUMMIES PO) Take by mouth 2 times daily       Pyridoxine HCl (B-6) 100 MG TABS Take by mouth daily       rosuvastatin (CRESTOR) 40 MG tablet Take 1 tablet (40 mg) by mouth daily 90 tablet 1     Selenium 200 MCG CAPS Take by mouth daily       tamsulosin (FLOMAX) 0.4 MG capsule Take 0.4 mg by mouth daily       vitamin E 400 UNITS TABS Take 400 Units by mouth 3 times daily         ALLERGIES     Allergies   Allergen Reactions     Percocet [Oxycodone-Acetaminophen]      hallucinations       PAST MEDICAL HISTORY:  Past Medical History:   Diagnosis Date     CAD (coronary artery disease) 01/2016    PCI, two DAYANNA to proximal and mid LAD     Esophageal reflux      HEARING LOSS NOISE-INDUCED 9/25/2005     History of blood transfusion     given during knee arthrotomy     HTN, goal below 140/90      Hyperlipidemia LDL goal <100 2/10/2010     Mixed hyperlipidemia 6/4/2005    Cardiac Risk Factors: male over 45, HTN, family history; goal LDL < 130; [place] 10-year Cardiac Risk Score of 12%      Post PTCA 3-    staged procedure-DAYANNA OM 2     SLEEP APNEA     treated surgically, uvulopharyngoplasty;not using CPAP     Stable Renal Cyst 11/26/2005    followed by Tasneem Jackson  Urology     Type II or unspecified type diabetes mellitus with renal manifestations, not stated as uncontrolled(250.40) (H) 10/15/2015     Uric acid nephrolithiasis        PAST SURGICAL HISTORY:  Past Surgical History:   Procedure Laterality Date     ANGIOGRAM  01/27/2016    Successful PCI with drug-eluting stent placement in the proximal and mid LAD     ARTHROSCOPY SHOULDER ROTATOR CUFF REPAIR  3/18/2014    Procedure: ARTHROSCOPY SHOULDER ROTATOR CUFF REPAIR;  Left shoulder arthroscopic assessment, subacromial decompression, biceps tenodesis,  arthroscopic rotator cuff repair       COLONOSCOPY N/A 7/14/2017    Procedure: COMBINED COLONOSCOPY, SINGLE OR MULTIPLE BIOPSY/POLYPECTOMY BY BIOPSY;  Colonoscopy with polypectomies by jumbo forceps and exacto snare;  Surgeon: Gabriella England MD;  Location:  GI     ENT SURGERY      uppp, tonsils ,septoplasty     EYE SURGERY      lasik     GENITOURINARY SURGERY      surg for kidney stone     HEART CATH DRUG ELUTING STENT PLACEMENT  03/11/2016    DAYANNA to second obtuse marginal     SURGICAL HISTORY OF -       Nissen Fundoplication     SURGICAL HISTORY OF -       ACL repair     SURGICAL HISTORY OF -       Uvuloplasty     SURGICAL HISTORY OF -       right shoulder arthroplasty and rotator cuff reconstruction     SURGICAL HISTORY OF -       arthroscopy of left knee       FAMILY HISTORY:  Family History   Problem Relation Age of Onset     Arthritis Father      GOUT     C.A.D. Father      ASCVD, age of onset over 80     C.A.D. Mother      ANGINA; premature onset     C.A.D. Maternal Grandfather      fatal MI under age 60     Prostate Cancer Paternal Uncle      onset over age 65     Cancer - colorectal No family hx of      Colon Cancer No family hx of        SOCIAL HISTORY:  Social History     Social History     Marital status:      Spouse name: N/A     Number of children: N/A     Years of education: N/A     Social History Main Topics     Smoking status: Current Some Day  "Smoker     Types: Cigars     Smokeless tobacco: Never Used      Comment: cigar once per month     Alcohol use 0.0 oz/week     0 Standard drinks or equivalent per week      Comment: 1 drinks per week at most     Drug use: No     Sexual activity: Yes     Partners: Female     Birth control/ protection: Surgical      Comment: monogamous     Other Topics Concern     Caffeine Concern Yes     4-5 cups     Sleep Concern Yes     sleep apnea - does not use c-pap,  nocturia     Special Diet Yes     low carb low fat low salt     Exercise Yes     treadmill every day airbike every day light weights ,  cardiac rehab 3 times per week     Social History Narrative       Review of Systems:  Skin:  Negative       Eyes:  Negative      ENT:  Positive for hearing loss;tinnitus hearing aids  Respiratory:  Positive for dyspnea on exertion;sleep apnea dyspnea is \"moderate\"; does not use CPAP   Cardiovascular:    Positive for twinge on left side when laying on left side - last episode was 1 month ago  Gastroenterology: Negative      Genitourinary:  Positive for nocturia;erectile dysfunction    Musculoskeletal:  Positive for joint pain;joint stiffness lower back ache occ; knees  Neurologic:  Negative      Psychiatric:  Positive for sleep disturbances stress/anxiety with moving to [place]  Heme/Lymph/Imm:  Positive for allergies RX allergy  Endocrine:  Positive for diabetes      Physical Exam:  Vitals: /76 (BP Location: Right arm, Patient Position: Chair, Cuff Size: Adult Large)  Pulse 76  Ht 1.778 m (5' 10\")  Wt 108.7 kg (239 lb 9.6 oz)  BMI 34.38 kg/m2    Constitutional:  cooperative, alert and oriented, well developed, well nourished, in no acute distress        Skin:  warm and dry to the touch, no apparent skin lesions or masses noted          Head:  normocephalic, no masses or lesions        Eyes:  sclera white;conjunctivae and lids unremarkable;pupils equal and round;no xanthalasma        Lymph:      ENT:  no pallor or cyanosis, " dentition good        Neck:  JVP normal;no carotid bruit        Respiratory:  clear to auscultation;normal symmetry;normal respiratory excursion         Cardiac: regular rhythm;normal S1 and S2   S4 no presence of murmur          pulses full and equal                                        GI:  abdomen soft;abdomen soft, non-tender, BS normoactive, no mass, no HSM, no bruits        Extremities and Muscular Skeletal:  no edema;no deformities, clubbing, cyanosis, erythema observed              Neurological:  no gross motor deficits;affect appropriate        Psych:  Alert and Oriented x 3        CC  Brandt Pinon MD  [facility]  [address] [id number]

## 2018-04-24 NOTE — LETTER
4/24/2018    Nick Morton MD  [address] [place] [address]    RE: Karsten Christy       Dear Colleague,    I had the pleasure of seeing Karsten Christy in the [facility].    HPI and Plan:   See dictation    No orders of the defined types were placed in this encounter.      No orders of the defined types were placed in this encounter.      There are no discontinued medications.      Encounter Diagnoses   Name Primary?     Coronary artery disease involving native coronary artery of native heart without angina pectoris      Coronary artery disease due to lipid rich plaque Yes     Essential hypertension        CURRENT MEDICATIONS:  Current Outpatient Prescriptions   Medication Sig Dispense Refill     aspirin 81 MG tablet Take 81 mg by mouth daily        blood glucose (FREESTYLE TEST STRIPS) test strip use as directed 100 strip 11     blood glucose monitoring (FREESTYLE) lancets change every 3 pokes. 3 Box 3     Cholecalciferol (VITAMIN D3 PO) Take 1,000 Units by mouth 2 times daily        glucosamine-chondroitin 500-400 MG CAPS Take 1 capsule by mouth daily       multivitamin, therapeutic with minerals (MULTI-VITAMIN) TABS Take 1 tablet by mouth daily       nitroglycerin (NITROSTAT) 0.4 MG SL tablet Place 1 tablet (0.4 mg) under the tongue every 5 minutes as needed for chest pain 25 tablet 2     tadalafil (CIALIS) 10 MG tablet Take 1 tablet (10 mg) by mouth daily as needed for erectile dysfunction TAKEN AT LEAST 30 MINUTES BEFORE SEXUAL ACTIVITY 12 tablet 5     vitamin  B complex with vitamin C (VITAMIN  B COMPLEX) TABS Take 1 tablet by mouth daily       amLODIPine (NORVASC) 5 MG tablet TAKE 1 TABLET BY MOUTH DAILY 90 tablet 0     Calcium-Vitamin D-Vitamin K (VIACTIV PO) Take by mouth 2 times daily       carvedilol (COREG) 6.25 MG tablet Take 1 tablet (6.25 mg) by mouth 2 times daily (with meals) 180 tablet 3     CINNAMON PO Take 1,000 mg by mouth 2 times daily        clopidogrel (PLAVIX) 75 MG tablet Take 1 tablet (75 mg) by mouth daily 90 tablet 3     Cyanocobalamin (B-12) 1000 MCG CAPS Take by mouth daily       FIBER ADULT GUMMIES PO Take 4 g by mouth 2 times daily       glimepiride (AMARYL) 1 MG tablet Take 1 tablet (1 mg) by mouth every morning (before breakfast) 90 tablet 3     Green Tea, Camillia sinensis, 315 MG CAPS Take by mouth 2 times daily       guaiFENesin 400 MG TABS Take by mouth 2 times daily       lisinopril (PRINIVIL/ZESTRIL) 5 MG tablet Take 1 tablet (5 mg) by mouth daily 90 tablet 3     Lutein 20 MG TABS Take by mouth 2 times daily       metFORMIN (GLUCOPHAGE) 1000 MG tablet TAKE 1 TABLET(1000 MG) BY MOUTH TWICE DAILY WITH MEALS 180 tablet 1     Multiple Vitamins-Minerals (HAIR SKIN AND NAILS FORMULA PO) Take by mouth 2 times daily       niacin 500 MG tablet Take 500 mg by mouth 2 times daily (with meals)       niacinamide 500 MG tablet Take 500 mg by mouth daily       Pediatric Multivit-Minerals-C (CHILDRENS GUMMIES PO) Take by mouth 2 times daily       Pyridoxine HCl (B-6) 100 MG TABS Take by mouth daily       rosuvastatin (CRESTOR) 40 MG tablet Take 1 tablet (40 mg) by mouth daily 90 tablet 1     Selenium 200 MCG CAPS Take by mouth daily       tamsulosin (FLOMAX) 0.4 MG capsule Take 0.4 mg by mouth daily       vitamin E 400 UNITS TABS Take 400 Units by mouth 3 times daily         ALLERGIES     Allergies   Allergen Reactions     Percocet [Oxycodone-Acetaminophen]      hallucinations       PAST MEDICAL HISTORY:  Past Medical History:   Diagnosis Date     CAD (coronary artery disease) 01/2016    PCI, two DAYANNA to proximal and mid LAD     Esophageal reflux      HEARING LOSS NOISE-INDUCED 9/25/2005     History of blood transfusion     given during knee arthrotomy     HTN, goal below 140/90      Hyperlipidemia LDL goal <100 2/10/2010     Mixed hyperlipidemia 6/4/2005    Cardiac Risk Factors: male over 45, HTN, family history; goal LDL < 130; [place] 10-year  Cardiac Risk Score of 12%      Post PTCA 3-    staged procedure-DAYANNA OM 2     SLEEP APNEA     treated surgically, uvulopharyngoplasty;not using CPAP     Stable Renal Cyst 11/26/2005    followed by [facility]     Type II or unspecified type diabetes mellitus with renal manifestations, not stated as uncontrolled(250.40) (H) 10/15/2015     Uric acid nephrolithiasis        PAST SURGICAL HISTORY:  Past Surgical History:   Procedure Laterality Date     ANGIOGRAM  01/27/2016    Successful PCI with drug-eluting stent placement in the proximal and mid LAD     ARTHROSCOPY SHOULDER ROTATOR CUFF REPAIR  3/18/2014    Procedure: ARTHROSCOPY SHOULDER ROTATOR CUFF REPAIR;  Left shoulder arthroscopic assessment, subacromial decompression, biceps tenodesis,  arthroscopic rotator cuff repair       COLONOSCOPY N/A 7/14/2017    Procedure: COMBINED COLONOSCOPY, SINGLE OR MULTIPLE BIOPSY/POLYPECTOMY BY BIOPSY;  Colonoscopy with polypectomies by jumbo forceps and exacto snare;  Surgeon: Gabriella England MD;  Location:  GI     ENT SURGERY      uppp, tonsils ,septoplasty     EYE SURGERY      lasik     GENITOURINARY SURGERY      surg for kidney stone     HEART CATH DRUG ELUTING STENT PLACEMENT  03/11/2016    DAYANNA to second obtuse marginal     SURGICAL HISTORY OF -       Nissen Fundoplication     SURGICAL HISTORY OF -       ACL repair     SURGICAL HISTORY OF -       Uvuloplasty     SURGICAL HISTORY OF -       right shoulder arthroplasty and rotator cuff reconstruction     SURGICAL HISTORY OF -       arthroscopy of left knee       FAMILY HISTORY:  Family History   Problem Relation Age of Onset     Arthritis Father      GOUT     C.A.D. Father      ASCVD, age of onset over 80     C.A.D. Mother      ANGINA; premature onset     C.A.D. Maternal Grandfather      fatal MI under age 60     Prostate Cancer Paternal Uncle      onset over age 65     Cancer - colorectal No family hx of      Colon Cancer No family hx of   "      SOCIAL HISTORY:  Social History     Social History     Marital status:      Spouse name: N/A     Number of children: N/A     Years of education: N/A     Social History Main Topics     Smoking status: Current Some Day Smoker     Types: Cigars     Smokeless tobacco: Never Used      Comment: cigar once per month     Alcohol use 0.0 oz/week     0 Standard drinks or equivalent per week      Comment: 1 drinks per week at most     Drug use: No     Sexual activity: Yes     Partners: Female     Birth control/ protection: Surgical      Comment: monogamous     Other Topics Concern     Caffeine Concern Yes     4-5 cups     Sleep Concern Yes     sleep apnea - does not use c-pap,  nocturia     Special Diet Yes     low carb low fat low salt     Exercise Yes     treadmill every day airbike every day light weights ,  cardiac rehab 3 times per week     Social History Narrative       Review of Systems:  Skin:  Negative       Eyes:  Negative      ENT:  Positive for hearing loss;tinnitus hearing aids  Respiratory:  Positive for dyspnea on exertion;sleep apnea dyspnea is \"moderate\"; does not use CPAP   Cardiovascular:    Positive for twinge on left side when laying on left side - last episode was 1 month ago  Gastroenterology: Negative      Genitourinary:  Positive for nocturia;erectile dysfunction    Musculoskeletal:  Positive for joint pain;joint stiffness lower back ache occ; knees  Neurologic:  Negative      Psychiatric:  Positive for sleep disturbances stress/anxiety with moving to [place]  Heme/Lymph/Imm:  Positive for allergies RX allergy  Endocrine:  Positive for diabetes      Physical Exam:  Vitals: /76 (BP Location: Right arm, Patient Position: Chair, Cuff Size: Adult Large)  Pulse 76  Ht 1.778 m (5' 10\")  Wt 108.7 kg (239 lb 9.6 oz)  BMI 34.38 kg/m2    Constitutional:  cooperative, alert and oriented, well developed, well nourished, in no acute distress        Skin:  warm and dry to the touch, no " apparent skin lesions or masses noted          Head:  normocephalic, no masses or lesions        Eyes:  sclera white;conjunctivae and lids unremarkable;pupils equal and round;no xanthalasma        Lymph:      ENT:  no pallor or cyanosis, dentition good        Neck:  JVP normal;no carotid bruit        Respiratory:  clear to auscultation;normal symmetry;normal respiratory excursion         Cardiac: regular rhythm;normal S1 and S2   S4 no presence of murmur          pulses full and equal                                        GI:  abdomen soft;abdomen soft, non-tender, BS normoactive, no mass, no HSM, no bruits        Extremities and Muscular Skeletal:  no edema;no deformities, clubbing, cyanosis, erythema observed              Neurological:  no gross motor deficits;affect appropriate        Psych:  Alert and Oriented x 3        CC  Brandt Pinon MD  [place] [id number] [address] [id number]                Thank you for allowing me to participate in the care of your patient.      Sincerely,     Renato Loaiza MD, MD     [facility]    cc:   Brandt Pinon MD  [place] [id number] [address] [id number]

## 2018-05-14 ENCOUNTER — TELEPHONE (OUTPATIENT)
Dept: FAMILY MEDICINE | Facility: CLINIC | Age: 68
End: 2018-05-14

## 2018-05-14 ENCOUNTER — TRANSFERRED RECORDS (OUTPATIENT)
Dept: HEALTH INFORMATION MANAGEMENT | Facility: CLINIC | Age: 68
End: 2018-05-14

## 2018-05-14 LAB
CREAT SERPL-MCNC: 0.9 MG/DL (ref 0.6–1.3)
GFR SERPL CREATININE-BSD FRML MDRD: >60 ML/MIN/1.73M2 (ref 90–120)

## 2018-05-14 NOTE — TELEPHONE ENCOUNTER
Panel Management Review      Patient has the following on his problem list:     Diabetes    ASA: Passed    Last A1C  Lab Results   Component Value Date    A1C 7.5 11/10/2017    A1C 9.9 08/25/2017    A1C 11.3 05/05/2017    A1C 8.7 12/20/2016    A1C 8.0 10/28/2016     A1C tested: FAILED    Last LDL:    Lab Results   Component Value Date    CHOL 112 08/25/2017     Lab Results   Component Value Date    HDL 43 08/25/2017     Lab Results   Component Value Date    LDL 41 08/25/2017     Lab Results   Component Value Date    TRIG 140 08/25/2017     Lab Results   Component Value Date    CHOLHDLRATIO 4.4 09/25/2015     Lab Results   Component Value Date    NHDL 69 08/25/2017       Is the patient on a Statin? YES             Is the patient on Aspirin? YES    Medications     HMG CoA Reductase Inhibitors    rosuvastatin (CRESTOR) 40 MG tablet    Salicylates    aspirin 81 MG tablet          Last three blood pressure readings:  BP Readings from Last 3 Encounters:   04/24/18 126/76   11/10/17 124/74   10/13/17 142/82       Date of last diabetes office visit: 11/2017     Tobacco History:     History   Smoking Status     Current Some Day Smoker     Types: Cigars   Smokeless Tobacco     Never Used     Comment: cigar once per month           Composite cancer screening  Chart review shows that this patient is due/due soon for the following None  Summary:    Patient is due/failing the following:   A1C    Action needed:   Patient needs office visit for FU DM.    Type of outreach:    Phone, left message for patient to call back.     Questions for provider review:    None                                                                                                                                    Pati NAJERA, THU,AAMA       Chart routed to Care Team .

## 2018-05-21 ENCOUNTER — TRANSFERRED RECORDS (OUTPATIENT)
Dept: HEALTH INFORMATION MANAGEMENT | Facility: CLINIC | Age: 68
End: 2018-05-21

## 2018-06-12 DIAGNOSIS — I25.83 CORONARY ARTERY DISEASE DUE TO LIPID RICH PLAQUE: ICD-10-CM

## 2018-06-12 DIAGNOSIS — I25.10 CORONARY ARTERY DISEASE DUE TO LIPID RICH PLAQUE: ICD-10-CM

## 2018-06-12 DIAGNOSIS — E11.29 CONTROLLED TYPE 2 DIABETES MELLITUS WITH OTHER DIABETIC KIDNEY COMPLICATION, WITHOUT LONG-TERM CURRENT USE OF INSULIN (H): ICD-10-CM

## 2018-06-12 LAB — HBA1C MFR BLD: 8.2 % (ref 0–5.6)

## 2018-06-12 PROCEDURE — 83036 HEMOGLOBIN GLYCOSYLATED A1C: CPT | Performed by: INTERNAL MEDICINE

## 2018-06-12 PROCEDURE — 36415 COLL VENOUS BLD VENIPUNCTURE: CPT | Performed by: INTERNAL MEDICINE

## 2018-06-18 ENCOUNTER — TELEPHONE (OUTPATIENT)
Dept: PEDIATRICS | Facility: CLINIC | Age: 68
End: 2018-06-18

## 2018-06-18 DIAGNOSIS — E11.29 TYPE II OR UNSPECIFIED TYPE DIABETES MELLITUS WITH RENAL MANIFESTATIONS, NOT STATED AS UNCONTROLLED(250.40) (H): Primary | ICD-10-CM

## 2018-06-24 DIAGNOSIS — I25.10 CORONARY ARTERY DISEASE DUE TO LIPID RICH PLAQUE: ICD-10-CM

## 2018-06-24 DIAGNOSIS — I25.83 CORONARY ARTERY DISEASE DUE TO LIPID RICH PLAQUE: ICD-10-CM

## 2018-06-25 NOTE — TELEPHONE ENCOUNTER
unfortunately, I don't know any providers in [place]. Recommend he seek out an internist if able.

## 2018-06-25 NOTE — TELEPHONE ENCOUNTER
"Requested Prescriptions   Pending Prescriptions Disp Refills     rosuvastatin (CRESTOR) 40 MG tablet [Pharmacy Med Name: ROSUVASTATIN 40MG TABLETS]    Last Written Prescription Date:  6/27/2017  Last Fill Quantity: 90,  # refills: 12   Last office visit: 11/10/2017 with prescribing provider:  Nick Morton     Future Office Visit:     90 tablet 0     Sig: TAKE 1 TABLET BY MOUTH EVERY DAY    Statins Protocol Passed    6/24/2018 10:09 AM       Passed - LDL on file in past 12 months    Recent Labs   Lab Test  08/25/17   0914   LDL  41            Passed - No abnormal creatine kinase in past 12 months    No lab results found.            Passed - Recent (12 mo) or future (30 days) visit within the authorizing provider's specialty    Patient had office visit in the last 12 months or has a visit in the next 30 days with authorizing provider or within the authorizing provider's specialty.  See \"Patient Info\" tab in inbasket, or \"Choose Columns\" in Meds & Orders section of the refill encounter.           Passed - Patient is age 18 or older        amLODIPine (NORVASC) 5 MG tablet [Pharmacy Med Name: AMLODIPINE BESYLATE 5MG TABLETS]    Last Written Prescription Date:  4/3/2018  Last Fill Quantity: 90,  # refills: 0   Last office visit: 11/10/2017 with prescribing provider:  Nick Morton     Future Office Visit:     90 tablet 0     Sig: TAKE 1 TABLET BY MOUTH DAILY    Calcium Channel Blockers Protocol  Passed    6/24/2018 10:09 AM       Passed - Blood pressure under 140/90 in past 12 months    BP Readings from Last 3 Encounters:   04/24/18 126/76   11/10/17 124/74   10/13/17 142/82                Passed - Recent (12 mo) or future (30 days) visit within the authorizing provider's specialty    Patient had office visit in the last 12 months or has a visit in the next 30 days with authorizing provider or within the authorizing provider's specialty.  See \"Patient Info\" tab in inbasket, or \"Choose Columns\" in Meds & Orders " section of the refill encounter.           Passed - Patient is age 18 or older       Passed - Normal serum creatinine on file in past 12 months    Recent Labs   Lab Test 05/14/18   CR  0.9

## 2018-06-26 RX ORDER — AMLODIPINE BESYLATE 5 MG/1
TABLET ORAL
Qty: 90 TABLET | Refills: 1 | Status: SHIPPED | OUTPATIENT
Start: 2018-06-26

## 2018-06-26 RX ORDER — ROSUVASTATIN CALCIUM 40 MG/1
TABLET, COATED ORAL
Qty: 90 TABLET | Refills: 0 | Status: SHIPPED | OUTPATIENT
Start: 2018-06-26

## 2018-07-31 ENCOUNTER — MYC MEDICAL ADVICE (OUTPATIENT)
Dept: PEDIATRICS | Facility: CLINIC | Age: 68
End: 2018-07-31

## 2018-07-31 DIAGNOSIS — I25.10 CORONARY ARTERY DISEASE DUE TO LIPID RICH PLAQUE: ICD-10-CM

## 2018-07-31 DIAGNOSIS — I25.83 CORONARY ARTERY DISEASE DUE TO LIPID RICH PLAQUE: ICD-10-CM

## 2018-07-31 DIAGNOSIS — E11.29 TYPE II OR UNSPECIFIED TYPE DIABETES MELLITUS WITH RENAL MANIFESTATIONS, NOT STATED AS UNCONTROLLED(250.40) (H): ICD-10-CM

## 2018-08-02 NOTE — TELEPHONE ENCOUNTER
Medication is being filled for 1 time refill only due to:  Pt is due for an office visit.     Toya Sykes RN

## 2018-08-21 ENCOUNTER — TELEPHONE (OUTPATIENT)
Dept: PEDIATRICS | Facility: CLINIC | Age: 68
End: 2018-08-21

## 2018-08-21 DIAGNOSIS — E11.29 TYPE II OR UNSPECIFIED TYPE DIABETES MELLITUS WITH RENAL MANIFESTATIONS, NOT STATED AS UNCONTROLLED(250.40) (H): Primary | ICD-10-CM

## 2018-08-21 DIAGNOSIS — E78.5 HYPERLIPIDEMIA LDL GOAL <100: Primary | ICD-10-CM

## 2018-08-21 DIAGNOSIS — N20.0 CALCULUS OF KIDNEY: ICD-10-CM

## 2018-08-21 DIAGNOSIS — N28.1 ACQUIRED CYST OF KIDNEY: ICD-10-CM

## 2018-08-21 NOTE — TELEPHONE ENCOUNTER
"Requested Prescriptions   Pending Prescriptions Disp Refills     glimepiride (AMARYL) 1 MG tablet [Pharmacy Med Name: GLIMEPIRIDE 1MG TABLETS]  Last Written Prescription Date:  08/25/2017  Last Fill Quantity: 90 tablet,  # refills: 3   Last office visit: 11/10/2017 with prescribing provider:      Nick Morton MD        Future Office Visit:     90 tablet 0     Sig: TAKE 1 TABLET BY MOUTH IN THE MORNING BEFORE BREAKFAST    Sulfonylurea Agents Failed    8/21/2018  5:03 PM       Failed - Recent (6 mo) or future (30 days) visit within the authorizing provider's specialty    Patient had office visit in the last 6 months or has a visit in the next 30 days with authorizing provider or within the authorizing provider's specialty.  See \"Patient Info\" tab in inbasket, or \"Choose Columns\" in Meds & Orders section of the refill encounter.           Passed - Blood pressure less than 140/90 in past 6 months    BP Readings from Last 3 Encounters:   04/24/18 126/76   11/10/17 124/74   10/13/17 142/82                Passed - Patient has documented LDL within the past 12 mos.    Recent Labs   Lab Test  08/25/17   0914   LDL  41            Passed - Patient has had a Microalbumin in the past 12 mos.    Recent Labs   Lab Test  11/10/17   0953   MICROL  8   UMALCR  5.37            Passed - Patient has documented A1c within the specified period of time.    If HgbA1C is 8 or greater, it needs to be on file within the past 3 months.  If less than 8, must be on file within the past 6 months.     Recent Labs   Lab Test  06/12/18   0800   A1C  8.2*            Passed - Patient is age 18 or older       Passed - Patient has a recent creatinine (normal) within the past 12 mos.    Recent Labs   Lab Test 05/14/18   CR  0.9               "

## 2018-08-22 NOTE — TELEPHONE ENCOUNTER
Routing refill request to provider for review/approval because:  Labs not current:    Patient needs to be seen because:  Due for DM check    Oriana ValdezRN BSN  [facility]  [phone]

## 2018-08-23 RX ORDER — GLIMEPIRIDE 1 MG/1
TABLET ORAL
Qty: 90 TABLET | Refills: 0 | Status: SHIPPED | OUTPATIENT
Start: 2018-08-23

## 2018-08-23 NOTE — TELEPHONE ENCOUNTER
Called spoke to pt, now living in [place]. States he is not seeing any provider yet. Says he requested a Referral to a provider down there from us and has not heard back.   Routing to Dr. Morton to advise. Please callback patient with information on his mobile number.    Thanks  Declan GLOVER  Team Coodinator

## 2018-08-27 NOTE — TELEPHONE ENCOUNTER
I do no know of any providers whom I would refer patient to in [place]. Okay to send 3 months of prescription so patient has time to interview potential new providers and get established

## 2020-02-10 ENCOUNTER — HEALTH MAINTENANCE LETTER (OUTPATIENT)
Age: 70
End: 2020-02-10

## 2020-11-16 ENCOUNTER — HEALTH MAINTENANCE LETTER (OUTPATIENT)
Age: 70
End: 2020-11-16

## 2021-04-03 ENCOUNTER — HEALTH MAINTENANCE LETTER (OUTPATIENT)
Age: 71
End: 2021-04-03

## 2021-05-29 ENCOUNTER — HEALTH MAINTENANCE LETTER (OUTPATIENT)
Age: 71
End: 2021-05-29

## 2021-09-18 ENCOUNTER — HEALTH MAINTENANCE LETTER (OUTPATIENT)
Age: 71
End: 2021-09-18

## 2022-01-08 ENCOUNTER — HEALTH MAINTENANCE LETTER (OUTPATIENT)
Age: 72
End: 2022-01-08

## 2022-04-30 ENCOUNTER — HEALTH MAINTENANCE LETTER (OUTPATIENT)
Age: 72
End: 2022-04-30

## 2022-08-01 ENCOUNTER — OFFICE VISIT (OUTPATIENT)
Dept: URBAN - METROPOLITAN AREA CLINIC 51 | Facility: CLINIC | Age: 72
End: 2022-08-01
Payer: COMMERCIAL

## 2022-08-01 DIAGNOSIS — H43.313 VITREOUS MEMBRANES AND STRANDS, BILATERAL: Primary | ICD-10-CM

## 2022-08-01 DIAGNOSIS — E11.9 TYPE 2 DIABETES MELLITUS WITHOUT COMPLICATIONS: ICD-10-CM

## 2022-08-01 DIAGNOSIS — H52.4 PRESBYOPIA: ICD-10-CM

## 2022-08-01 DIAGNOSIS — Z98.890 OTHER SPECIFIED POSTPROCEDURAL STATES: ICD-10-CM

## 2022-08-01 DIAGNOSIS — H25.13 AGE-RELATED NUCLEAR CATARACT, BILATERAL: ICD-10-CM

## 2022-08-01 DIAGNOSIS — Z79.84 LONG TERM (CURRENT) USE OF ORAL ANTIDIABETIC DRUGS: ICD-10-CM

## 2022-08-01 PROCEDURE — 92134 CPTRZ OPH DX IMG PST SGM RTA: CPT | Performed by: OPTOMETRIST

## 2022-08-01 PROCEDURE — 92004 COMPRE OPH EXAM NEW PT 1/>: CPT | Performed by: OPTOMETRIST

## 2022-08-01 ASSESSMENT — INTRAOCULAR PRESSURE
OD: 11
OS: 11

## 2022-08-01 ASSESSMENT — KERATOMETRY
OD: 44.67
OS: 44.38

## 2022-08-01 ASSESSMENT — VISUAL ACUITY
OS: 20/20
OD: 20/20

## 2022-08-01 NOTE — IMPRESSION/PLAN
Impression: Type 2 diabetes mellitus without complications: D09.0. Plan: No diabetic retinopathy observed on today's examination. Discussed the importance of annual diabetic eye exams. Discussed with patient importance of good blood sugar control. Recommend continue f/u care for DM control with PCP. Send report to PCP. Monitor annually with MAC OCT, FUNDUS PHOTOS.

## 2022-08-01 NOTE — IMPRESSION/PLAN
Impression: Age-related nuclear cataract, bilateral: H25.13. Plan: Educated patient on condition and findings. Cataracts are mild and not visually significant or effecting ADLs/vision at this time. No treatment is currently warranted due to current level of vision. Patient will monitor vision closely and contact our office with any changes/ worsening in vision. Will re-evaluate on return visit.

## 2022-08-01 NOTE — IMPRESSION/PLAN
Impression: Other specified postprocedural states: Z98.890. Plan: History of LASIK. Flaps clear and centered OU.

## 2022-08-20 ENCOUNTER — HEALTH MAINTENANCE LETTER (OUTPATIENT)
Age: 72
End: 2022-08-20

## 2022-11-19 ENCOUNTER — HEALTH MAINTENANCE LETTER (OUTPATIENT)
Age: 72
End: 2022-11-19

## 2023-04-15 ENCOUNTER — HEALTH MAINTENANCE LETTER (OUTPATIENT)
Age: 73
End: 2023-04-15

## 2023-05-22 NOTE — TELEPHONE ENCOUNTER
Called and spoke with Heydi at [facility] regarding incision concerns. Per Heydi, she has not removed dressing from surgery. She would like to know if dressing should remain in place or if you would like dressing changes until seen in office?    Prescription approved per [facility] Refill Protocol.    Jayla GARCIA RN, BSN, PHN  [place] [facility] RN

## 2023-06-01 ENCOUNTER — HEALTH MAINTENANCE LETTER (OUTPATIENT)
Age: 73
End: 2023-06-01

## 2023-11-19 ENCOUNTER — HEALTH MAINTENANCE LETTER (OUTPATIENT)
Age: 73
End: 2023-11-19
